# Patient Record
Sex: FEMALE | Race: WHITE | NOT HISPANIC OR LATINO | Employment: FULL TIME | URBAN - NONMETROPOLITAN AREA
[De-identification: names, ages, dates, MRNs, and addresses within clinical notes are randomized per-mention and may not be internally consistent; named-entity substitution may affect disease eponyms.]

---
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
## 2019-01-09 ENCOUNTER — HOSPITAL ENCOUNTER (EMERGENCY)
Facility: HOSPITAL | Age: 19
Discharge: HOME | End: 2019-01-09
Attending: EMERGENCY MEDICINE | Admitting: EMERGENCY MEDICINE

## 2019-01-09 VITALS
BODY MASS INDEX: 31.28 KG/M2 | HEIGHT: 62 IN | OXYGEN SATURATION: 96 % | DIASTOLIC BLOOD PRESSURE: 61 MMHG | RESPIRATION RATE: 18 BRPM | TEMPERATURE: 98.4 F | HEART RATE: 74 BPM | WEIGHT: 170 LBS | SYSTOLIC BLOOD PRESSURE: 92 MMHG

## 2019-01-09 DIAGNOSIS — T50.901A ACCIDENTAL DRUG INGESTION, INITIAL ENCOUNTER: Primary | ICD-10-CM

## 2019-01-09 PROCEDURE — 99283 EMERGENCY DEPT VISIT LOW MDM: CPT

## 2019-01-09 NOTE — ED PROVIDER NOTES
Subjective   History of Present Illness   18-year-old female history depression and anxiety who actually took with her morning and evening doses of latuda, trazodone, hydroxyzine.  She had a single episode of emesis but otherwise is felt normal since this occurred.  Has no current complaints.  Her doses of each are respectively 80mg, 100mg and 25mg.    Review of Systems   All other systems reviewed and are negative.      Past Medical History:   Diagnosis Date   • Anxiety    • Asthma    • Depression    • Disease of thyroid gland        No Known Allergies    History reviewed. No pertinent surgical history.    Family History   Problem Relation Age of Onset   • Diabetes Father    • Diabetes Paternal Grandfather    • Diabetes Paternal Grandmother    • Stroke Maternal Grandmother        Social History     Socioeconomic History   • Marital status: Single     Spouse name: Not on file   • Number of children: Not on file   • Years of education: Not on file   • Highest education level: Not on file   Tobacco Use   • Smoking status: Never Smoker   • Smokeless tobacco: Never Used   Substance and Sexual Activity   • Alcohol use: No   • Drug use: No   • Sexual activity: Yes     Birth control/protection: Implant           Objective   Physical Exam   Constitutional: She is oriented to person, place, and time. She appears well-developed and well-nourished. No distress.   HENT:   Head: Normocephalic.   Mouth/Throat: Oropharynx is clear and moist. No oropharyngeal exudate.   Eyes: Right eye exhibits no discharge. Left eye exhibits no discharge. No scleral icterus.   Neck: Neck supple. No tracheal deviation present.   Cardiovascular: Normal rate, regular rhythm, normal heart sounds and intact distal pulses. Exam reveals no gallop and no friction rub.   No murmur heard.  Pulmonary/Chest: Effort normal and breath sounds normal. No stridor. No respiratory distress. She has no wheezes. She has no rales.   Abdominal: Soft. She exhibits no  distension and no mass. There is no tenderness. There is no rebound and no guarding.   Musculoskeletal: She exhibits no edema or deformity.   Neurological: She is alert and oriented to person, place, and time.   Skin: Skin is warm and dry. She is not diaphoretic. No erythema. No pallor.   Psychiatric: She has a normal mood and affect. Her behavior is normal.   Nursing note and vitals reviewed.      Procedures           ED Course                  MDM  18-year-old female here after axonal taking with morning and evening doses of 3 medications.  Afebrile, vital signs stable.  Very low suspicion for any type of adverse effect from these medications given that she is still likely in the therapeutic window.  Discussed that they may make her slightly more sleepy than usual.  However, will discuss with poison control to make sure they have no other suggestions and anticipate discharge home.    Final diagnoses:   Accidental drug ingestion, initial encounter            Johan Winchester MD  01/09/19 8073

## 2019-03-07 VITALS
SYSTOLIC BLOOD PRESSURE: 115 MMHG | OXYGEN SATURATION: 100 % | HEART RATE: 67 BPM | TEMPERATURE: 97.3 F | BODY MASS INDEX: 29.92 KG/M2 | DIASTOLIC BLOOD PRESSURE: 72 MMHG | HEIGHT: 62 IN | WEIGHT: 162.6 LBS | RESPIRATION RATE: 18 BRPM

## 2019-03-07 PROCEDURE — 99283 EMERGENCY DEPT VISIT LOW MDM: CPT

## 2019-03-08 ENCOUNTER — HOSPITAL ENCOUNTER (EMERGENCY)
Facility: HOSPITAL | Age: 19
Discharge: HOME | End: 2019-03-08
Attending: EMERGENCY MEDICINE | Admitting: EMERGENCY MEDICINE

## 2019-03-08 DIAGNOSIS — S05.01XA ABRASION OF RIGHT CORNEA, INITIAL ENCOUNTER: Primary | ICD-10-CM

## 2019-03-08 RX ORDER — HYDROXYZINE PAMOATE 25 MG/1
25 CAPSULE ORAL 3 TIMES DAILY PRN
COMMUNITY
End: 2021-11-09

## 2019-03-08 RX ORDER — TETRACAINE HYDROCHLORIDE 5 MG/ML
2 SOLUTION OPHTHALMIC ONCE
Status: COMPLETED | OUTPATIENT
Start: 2019-03-08 | End: 2019-03-08

## 2019-03-08 RX ADMIN — TETRACAINE HYDROCHLORIDE 2 DROP: 5 SOLUTION OPHTHALMIC at 01:30

## 2019-03-08 RX ADMIN — FLUORESCEIN SODIUM 1 STRIP: 1 STRIP OPHTHALMIC at 01:30

## 2019-03-08 NOTE — ED PROVIDER NOTES
Subjective   19-year-old female presents to the ED with a chief complaint of right eye pain.  The patient states that she was at work when she began developing pain and burning in her right eye.  She states that it developed to increased watering and sensitivity to light.  She states that it has worsened since initial onset.  She denies any known trauma or injury.  The patient denies fever or chills.  No other complaints at this time.            Review of Systems   Eyes: Positive for photophobia, pain, discharge and redness. Negative for itching and visual disturbance.   All other systems reviewed and are negative.      Past Medical History:   Diagnosis Date   • Anxiety    • Asthma    • Depression    • Disease of thyroid gland        No Known Allergies    History reviewed. No pertinent surgical history.    Family History   Problem Relation Age of Onset   • Diabetes Father    • Diabetes Paternal Grandfather    • Diabetes Paternal Grandmother    • Stroke Maternal Grandmother        Social History     Socioeconomic History   • Marital status: Single     Spouse name: Not on file   • Number of children: Not on file   • Years of education: Not on file   • Highest education level: Not on file   Tobacco Use   • Smoking status: Never Smoker   • Smokeless tobacco: Never Used   Substance and Sexual Activity   • Alcohol use: No   • Drug use: No   • Sexual activity: Yes     Birth control/protection: Implant           Objective   Physical Exam   Constitutional: She is oriented to person, place, and time. She appears well-developed and well-nourished. No distress.   HENT:   Head: Normocephalic and atraumatic.   Nose: Nose normal.   Eyes: Conjunctivae and EOM are normal.   Fluoroscein seen staining of the right eye performed shows a small corneal abrasion at the 9 o'clock position over the pupil.   Cardiovascular: Normal rate and regular rhythm.   Pulmonary/Chest: Effort normal and breath sounds normal. No respiratory distress.    Abdominal: Soft. She exhibits no distension. There is no tenderness. There is no guarding.   Musculoskeletal: She exhibits no edema or deformity.   Neurological: She is alert and oriented to person, place, and time. No cranial nerve deficit.   Skin: She is not diaphoretic.   Nursing note and vitals reviewed.      Procedures           ED Course        Presents with right eye pain and redness.  She does have some corneal injection on initial examination.  Fluorescein staining was performed showed a corneal abrasion.  She will be discharged with appropriate treatment.          MDM      Final diagnoses:   Abrasion of right cornea, initial encounter            Rishi Hollingsworth, DO  03/08/19 0412

## 2019-05-10 ENCOUNTER — TRANSCRIBE ORDERS (OUTPATIENT)
Dept: ULTRASOUND IMAGING | Facility: HOSPITAL | Age: 19
End: 2019-05-10

## 2019-05-10 DIAGNOSIS — R10.12 LEFT UPPER QUADRANT PAIN: Primary | ICD-10-CM

## 2019-05-12 ENCOUNTER — HOSPITAL ENCOUNTER (EMERGENCY)
Facility: HOSPITAL | Age: 19
Discharge: HOME | End: 2019-05-12
Attending: EMERGENCY MEDICINE | Admitting: EMERGENCY MEDICINE

## 2019-05-12 ENCOUNTER — APPOINTMENT (OUTPATIENT)
Dept: CT IMAGING | Facility: HOSPITAL | Age: 19
End: 2019-05-12

## 2019-05-12 VITALS
SYSTOLIC BLOOD PRESSURE: 112 MMHG | RESPIRATION RATE: 18 BRPM | TEMPERATURE: 98.2 F | OXYGEN SATURATION: 100 % | HEIGHT: 62 IN | HEART RATE: 66 BPM | BODY MASS INDEX: 28.82 KG/M2 | DIASTOLIC BLOOD PRESSURE: 57 MMHG | WEIGHT: 156.6 LBS

## 2019-05-12 DIAGNOSIS — R10.9 LEFT FLANK PAIN: ICD-10-CM

## 2019-05-12 DIAGNOSIS — R10.12 ABDOMINAL PAIN, LEFT UPPER QUADRANT: Primary | ICD-10-CM

## 2019-05-12 LAB
ALBUMIN SERPL-MCNC: 4.6 G/DL (ref 3.5–5)
ALBUMIN/GLOB SERPL: 1.4 G/DL (ref 1–2)
ALP SERPL-CCNC: 61 U/L (ref 38–126)
ALT SERPL W P-5'-P-CCNC: 20 U/L (ref 13–69)
ANION GAP SERPL CALCULATED.3IONS-SCNC: 16 MMOL/L (ref 10–20)
AST SERPL-CCNC: 19 U/L (ref 15–46)
B-HCG UR QL: NEGATIVE
BASOPHILS # BLD AUTO: 0.03 10*3/MM3 (ref 0–0.2)
BASOPHILS NFR BLD AUTO: 0.3 % (ref 0–1.5)
BILIRUB SERPL-MCNC: 0.6 MG/DL (ref 0.2–1.3)
BILIRUB UR QL STRIP: NEGATIVE
BUN BLD-MCNC: 12 MG/DL (ref 7–20)
BUN/CREAT SERPL: 17.1 (ref 7.1–23.5)
CALCIUM SPEC-SCNC: 9.2 MG/DL (ref 8.4–10.2)
CHLORIDE SERPL-SCNC: 103 MMOL/L (ref 98–107)
CLARITY UR: CLEAR
CO2 SERPL-SCNC: 24 MMOL/L (ref 26–30)
COLOR UR: YELLOW
CREAT BLD-MCNC: 0.7 MG/DL (ref 0.6–1.3)
DEPRECATED RDW RBC AUTO: 37.3 FL (ref 37–54)
EOSINOPHIL # BLD AUTO: 0.09 10*3/MM3 (ref 0–0.4)
EOSINOPHIL NFR BLD AUTO: 0.9 % (ref 0.3–6.2)
ERYTHROCYTE [DISTWIDTH] IN BLOOD BY AUTOMATED COUNT: 11.3 % (ref 12.3–15.4)
GFR SERPL CREATININE-BSD FRML MDRD: 107 ML/MIN/1.73
GLOBULIN UR ELPH-MCNC: 3.3 GM/DL
GLUCOSE BLD-MCNC: 88 MG/DL (ref 74–98)
GLUCOSE UR STRIP-MCNC: NEGATIVE MG/DL
HCT VFR BLD AUTO: 41.5 % (ref 34–46.6)
HGB BLD-MCNC: 14.6 G/DL (ref 12–15.9)
HGB UR QL STRIP.AUTO: NEGATIVE
IMM GRANULOCYTES # BLD AUTO: 0.01 10*3/MM3 (ref 0–0.05)
IMM GRANULOCYTES NFR BLD AUTO: 0.1 % (ref 0–0.5)
KETONES UR QL STRIP: NEGATIVE
LEUKOCYTE ESTERASE UR QL STRIP.AUTO: NEGATIVE
LIPASE SERPL-CCNC: 109 U/L (ref 23–300)
LYMPHOCYTES # BLD AUTO: 2.81 10*3/MM3 (ref 0.7–3.1)
LYMPHOCYTES NFR BLD AUTO: 29.6 % (ref 19.6–45.3)
MCH RBC QN AUTO: 31.9 PG (ref 26.6–33)
MCHC RBC AUTO-ENTMCNC: 35.2 G/DL (ref 31.5–35.7)
MCV RBC AUTO: 90.8 FL (ref 79–97)
MONOCYTES # BLD AUTO: 0.45 10*3/MM3 (ref 0.1–0.9)
MONOCYTES NFR BLD AUTO: 4.7 % (ref 5–12)
NEUTROPHILS # BLD AUTO: 6.09 10*3/MM3 (ref 1.7–7)
NEUTROPHILS NFR BLD AUTO: 64.4 % (ref 42.7–76)
NITRITE UR QL STRIP: NEGATIVE
NRBC BLD AUTO-RTO: 0 /100 WBC (ref 0–0.2)
PH UR STRIP.AUTO: 6 [PH] (ref 5–8)
PLATELET # BLD AUTO: 210 10*3/MM3 (ref 140–450)
PMV BLD AUTO: 10.8 FL (ref 6–12)
POTASSIUM BLD-SCNC: 4 MMOL/L (ref 3.5–5.1)
PROT SERPL-MCNC: 7.9 G/DL (ref 6.3–8.2)
PROT UR QL STRIP: NEGATIVE
RBC # BLD AUTO: 4.57 10*6/MM3 (ref 3.77–5.28)
SODIUM BLD-SCNC: 139 MMOL/L (ref 137–145)
SP GR UR STRIP: 1.02 (ref 1–1.03)
UROBILINOGEN UR QL STRIP: NORMAL
WBC NRBC COR # BLD: 9.48 10*3/MM3 (ref 3.4–10.8)

## 2019-05-12 PROCEDURE — 25010000002 IOPAMIDOL 61 % SOLUTION: Performed by: EMERGENCY MEDICINE

## 2019-05-12 PROCEDURE — 85025 COMPLETE CBC W/AUTO DIFF WBC: CPT | Performed by: PHYSICIAN ASSISTANT

## 2019-05-12 PROCEDURE — 80053 COMPREHEN METABOLIC PANEL: CPT | Performed by: PHYSICIAN ASSISTANT

## 2019-05-12 PROCEDURE — 83690 ASSAY OF LIPASE: CPT | Performed by: PHYSICIAN ASSISTANT

## 2019-05-12 PROCEDURE — 74177 CT ABD & PELVIS W/CONTRAST: CPT

## 2019-05-12 PROCEDURE — 81003 URINALYSIS AUTO W/O SCOPE: CPT | Performed by: PHYSICIAN ASSISTANT

## 2019-05-12 PROCEDURE — 81025 URINE PREGNANCY TEST: CPT | Performed by: PHYSICIAN ASSISTANT

## 2019-05-12 PROCEDURE — 99283 EMERGENCY DEPT VISIT LOW MDM: CPT

## 2019-05-12 RX ORDER — SODIUM CHLORIDE 0.9 % (FLUSH) 0.9 %
10 SYRINGE (ML) INJECTION AS NEEDED
Status: DISCONTINUED | OUTPATIENT
Start: 2019-05-12 | End: 2019-05-12 | Stop reason: HOSPADM

## 2019-05-12 RX ORDER — POLYETHYLENE GLYCOL 3350 17 G/17G
17 POWDER, FOR SOLUTION ORAL 2 TIMES DAILY
Qty: 12 PACKET | Refills: 0 | Status: SHIPPED | OUTPATIENT
Start: 2019-05-12 | End: 2021-11-09

## 2019-05-12 RX ORDER — SIMETHICONE 80 MG
80 TABLET,CHEWABLE ORAL EVERY 6 HOURS PRN
Qty: 40 TABLET | Refills: 0 | Status: SHIPPED | OUTPATIENT
Start: 2019-05-12 | End: 2021-11-09

## 2019-05-12 RX ADMIN — SODIUM CHLORIDE 1000 ML: 9 INJECTION, SOLUTION INTRAVENOUS at 11:46

## 2019-05-12 RX ADMIN — IOPAMIDOL 100 ML: 612 INJECTION, SOLUTION INTRAVENOUS at 13:21

## 2019-05-12 NOTE — ED PROVIDER NOTES
"Subjective   19-year-old female presents to emergency department with a 2-week history of left upper quadrant abdominal pain, radiating through \"my whole left side\".  She states the pain began intermittently, but is now fairly constant.  Slightly worse with movement, associated with increased fatigue, increased frequency.  She denies cough chest congestion sputum or hemoptysis.  No tachypnea or respiratory distress.  No melena or hematochezia.  No pale chalky or yellow stool.  No documented fevers, but complain of chills and seats a t night.  She does have a history of asthma.  No dysuria hematuria pyuria.  No vaginal discharge or vaginal bleeding.  Last menstrual period was unknown, she has an IUD.  Surgical history is unremarkable.  Social history she denies tobacco alcohol or drug use.  She is employed as a  at [employer].  Family history is remarkable for diabetes and CVA.  Medication list reviewed.  She denies drug allergies.        History provided by:  Patient  Illness   Location:  Per HPI  Quality:  Per HPI  Severity:  Moderate  Onset quality:  Gradual  Duration:  2 weeks  Timing:  Intermittent  Progression:  Waxing and waning  Chronicity:  New  Context:  Per HPI  Relieved by:  Per HPI  Worsened by:  Per HPI  Ineffective treatments:  Per HPI  Associated symptoms: abdominal pain and fatigue    Associated symptoms: no chest pain, no congestion, no cough, no diarrhea, no fever, no headaches, no myalgias, no nausea, no rash, no rhinorrhea, no shortness of breath and no vomiting        Review of Systems   Constitutional: Positive for activity change, appetite change, chills and fatigue. Negative for fever.   HENT: Negative for congestion and rhinorrhea.    Respiratory: Negative for cough and shortness of breath.    Cardiovascular: Negative for chest pain.   Gastrointestinal: Positive for abdominal pain and constipation. Negative for abdominal distention, blood in stool, diarrhea, nausea and vomiting. "   Genitourinary: Negative for difficulty urinating, dysuria, flank pain, vaginal bleeding and vaginal discharge.   Musculoskeletal: Negative for myalgias.   Skin: Negative for rash.   Neurological: Negative for headaches.   All other systems reviewed and are negative.      Past Medical History:   Diagnosis Date   • Anxiety    • Asthma    • Depression    • Disease of thyroid gland        No Known Allergies    History reviewed. No pertinent surgical history.    Family History   Problem Relation Age of Onset   • Diabetes Father    • Diabetes Paternal Grandfather    • Diabetes Paternal Grandmother    • Stroke Maternal Grandmother        Social History     Socioeconomic History   • Marital status: Single     Spouse name: Not on file   • Number of children: Not on file   • Years of education: Not on file   • Highest education level: Not on file   Tobacco Use   • Smoking status: Never Smoker   • Smokeless tobacco: Never Used   Substance and Sexual Activity   • Alcohol use: No   • Drug use: No   • Sexual activity: Yes     Birth control/protection: Implant           Objective   Physical Exam   Constitutional: She is oriented to person, place, and time. She appears well-developed and well-nourished. No distress.   HENT:   Head: Normocephalic and atraumatic.   Right Ear: External ear normal.   Left Ear: External ear normal.   Nose: Nose normal.   Mouth/Throat: Oropharynx is clear and moist. No oropharyngeal exudate.   Eyes: Conjunctivae and EOM are normal. Pupils are equal, round, and reactive to light. Right eye exhibits no discharge. Left eye exhibits no discharge. No scleral icterus.   Neck: Normal range of motion. Neck supple. No JVD present. No tracheal deviation present. No thyromegaly present.   Cardiovascular: Normal rate.   Pulmonary/Chest: Effort normal. No stridor. No respiratory distress.   Abdominal: Soft. Bowel sounds are normal. She exhibits no distension, no abdominal bruit, no pulsatile midline mass and no  mass. There is no hepatosplenomegaly. There is tenderness in the left upper quadrant. There is no rigidity, no rebound, no guarding, no CVA tenderness (mild L CVA), no tenderness at McBurney's point and negative Munson's sign.   Musculoskeletal: Normal range of motion. She exhibits no edema, tenderness or deformity.   Neurological: She is alert and oriented to person, place, and time. No cranial nerve deficit. She exhibits normal muscle tone. Coordination normal.   Skin: Skin is warm and dry. No rash noted. She is not diaphoretic. No erythema. No pallor.   Psychiatric: She has a normal mood and affect. Her behavior is normal. Judgment and thought content normal.   Nursing note and vitals reviewed.      Procedures           ED Course        Recent Results (from the past 24 hour(s))   Comprehensive Metabolic Panel    Collection Time: 05/12/19 11:43 AM   Result Value Ref Range    Glucose 88 74 - 98 mg/dL    BUN 12 7 - 20 mg/dL    Creatinine 0.70 0.60 - 1.30 mg/dL    Sodium 139 137 - 145 mmol/L    Potassium 4.0 3.5 - 5.1 mmol/L    Chloride 103 98 - 107 mmol/L    CO2 24.0 (L) 26.0 - 30.0 mmol/L    Calcium 9.2 8.4 - 10.2 mg/dL    Total Protein 7.9 6.3 - 8.2 g/dL    Albumin 4.60 3.50 - 5.00 g/dL    ALT (SGPT) 20 13 - 69 U/L    AST (SGOT) 19 15 - 46 U/L    Alkaline Phosphatase 61 38 - 126 U/L    Total Bilirubin 0.6 0.2 - 1.3 mg/dL    eGFR Non African Amer 107 >60 mL/min/1.73    Globulin 3.3 gm/dL    A/G Ratio 1.4 1.0 - 2.0 g/dL    BUN/Creatinine Ratio 17.1 7.1 - 23.5    Anion Gap 16.0 10.0 - 20.0 mmol/L   Urinalysis With Microscopic If Indicated (No Culture) - Urine, Clean Catch    Collection Time: 05/12/19 11:43 AM   Result Value Ref Range    Color, UA Yellow Yellow, Straw    Appearance, UA Clear Clear    pH, UA 6.0 5.0 - 8.0    Specific Gravity, UA 1.021 1.005 - 1.030    Glucose, UA Negative Negative    Ketones, UA Negative Negative    Bilirubin, UA Negative Negative    Blood, UA Negative Negative    Protein, UA Negative  Negative    Leuk Esterase, UA Negative Negative    Nitrite, UA Negative Negative    Urobilinogen, UA 0.2 E.U./dL 0.2 - 1.0 E.U./dL   Lipase    Collection Time: 05/12/19 11:43 AM   Result Value Ref Range    Lipase 109 23 - 300 U/L   Pregnancy, Urine - Urine, Clean Catch    Collection Time: 05/12/19 11:43 AM   Result Value Ref Range    HCG, Urine QL Negative Negative   CBC Auto Differential    Collection Time: 05/12/19 11:43 AM   Result Value Ref Range    WBC 9.48 3.40 - 10.80 10*3/mm3    RBC 4.57 3.77 - 5.28 10*6/mm3    Hemoglobin 14.6 12.0 - 15.9 g/dL    Hematocrit 41.5 34.0 - 46.6 %    MCV 90.8 79.0 - 97.0 fL    MCH 31.9 26.6 - 33.0 pg    MCHC 35.2 31.5 - 35.7 g/dL    RDW 11.3 (L) 12.3 - 15.4 %    RDW-SD 37.3 37.0 - 54.0 fl    MPV 10.8 6.0 - 12.0 fL    Platelets 210 140 - 450 10*3/mm3    Neutrophil % 64.4 42.7 - 76.0 %    Lymphocyte % 29.6 19.6 - 45.3 %    Monocyte % 4.7 (L) 5.0 - 12.0 %    Eosinophil % 0.9 0.3 - 6.2 %    Basophil % 0.3 0.0 - 1.5 %    Immature Grans % 0.1 0.0 - 0.5 %    Neutrophils, Absolute 6.09 1.70 - 7.00 10*3/mm3    Lymphocytes, Absolute 2.81 0.70 - 3.10 10*3/mm3    Monocytes, Absolute 0.45 0.10 - 0.90 10*3/mm3    Eosinophils, Absolute 0.09 0.00 - 0.40 10*3/mm3    Basophils, Absolute 0.03 0.00 - 0.20 10*3/mm3    Immature Grans, Absolute 0.01 0.00 - 0.05 10*3/mm3    nRBC 0.0 0.0 - 0.2 /100 WBC     Note: In addition to lab results from this visit, the labs listed above may include labs taken at another facility or during a different encounter within the last 24 hours. Please correlate lab times with ED admission and discharge times for further clarification of the services performed during this visit.    CT Abdomen Pelvis With Contrast   Final Result   No evidence of acute intra-abdominal process.      Authenticated by Bebo North III, MD on 05/12/2019   01:57:18 PM        Vitals:    05/12/19 1103   BP: 101/65   BP Location: Left arm   Patient Position: Sitting   Pulse: 66   Resp: 16  "  Temp: 98.4 °F (36.9 °C)   TempSrc: Oral   SpO2: 99%   Weight: 71 kg (156 lb 9.6 oz)   Height: 157.5 cm (62\")     Medications   sodium chloride 0.9 % flush 10 mL (not administered)   sodium chloride 0.9 % bolus 1,000 mL (0 mL Intravenous Stopped 5/12/19 1253)   iopamidol (ISOVUE-300) 61 % injection 100 mL (100 mL Intravenous Given 5/12/19 1321)     ECG/EMG Results (last 24 hours)     ** No results found for the last 24 hours. **        No orders to display                 MDM      Final diagnoses:   Abdominal pain, left upper quadrant   Left flank pain            Nick Starks PA-C  05/12/19 3803    "

## 2019-05-12 NOTE — DISCHARGE INSTRUCTIONS
Increase clear fluid intake.  Begin taking MiraLAX twice daily until bowel movements are the consistency of peanut butter.  Mylicon/simethicone to assist with gas pain.  Follow-up with your primary care provider for recheck in 2 days.  Return to the emergency department immediately if any change or worsening of your symptoms.

## 2019-05-16 ENCOUNTER — APPOINTMENT (OUTPATIENT)
Dept: ULTRASOUND IMAGING | Facility: HOSPITAL | Age: 19
End: 2019-05-16

## 2019-06-20 ENCOUNTER — OFFICE VISIT (OUTPATIENT)
Dept: OBSTETRICS AND GYNECOLOGY | Facility: CLINIC | Age: 19
End: 2019-06-20

## 2019-06-20 VITALS
DIASTOLIC BLOOD PRESSURE: 70 MMHG | WEIGHT: 152.8 LBS | BODY MASS INDEX: 28.12 KG/M2 | SYSTOLIC BLOOD PRESSURE: 110 MMHG | HEIGHT: 62 IN

## 2019-06-20 DIAGNOSIS — R10.32 LEFT LOWER QUADRANT PAIN: Primary | ICD-10-CM

## 2019-06-20 DIAGNOSIS — Z87.42 HISTORY OF OVARIAN CYST: ICD-10-CM

## 2019-06-20 PROCEDURE — 99214 OFFICE O/P EST MOD 30 MIN: CPT | Performed by: PHYSICIAN ASSISTANT

## 2019-06-20 RX ORDER — NORETHINDRONE ACETATE AND ETHINYL ESTRADIOL 1MG-20(21)
1 KIT ORAL DAILY
Qty: 28 TABLET | Refills: 2 | Status: SHIPPED | OUTPATIENT
Start: 2019-06-20 | End: 2020-06-19

## 2019-06-20 NOTE — PROGRESS NOTES
Subjective   Chief Complaint   Patient presents with   • Follow-up     Following up from [facility] ER.  CT scan done in ER on 19 that showed an ovarian cyst.  CT is in chart       Raquel Guzman is a 19 y.o. year old  presenting to be seen for an ovarian cyst that was found on recent CT scan 2019 during an ER visit.  Patient reports that she had been experiencing some intermittent left mid abdominal pain that was occurring about every 2 weeks lasting 1 to 2 days.  Pain initially started around early May.  During her ER visit for 2019 she was noted to have a normal CBC, normal CMP, normal lipase, negative urine pregnancy test.  A CT scan done noted a small left ovarian cyst, no measurement given, and normal abdominal and pelvic anatomy otherwise.  The patient reports that for about 1 week she has had a loose stool but otherwise no bowel changes.  She has had no urinary symptoms.  She denies vaginal discharge.  She has Kyleena IUD placed about 1 year ago.  She has no bleeds with the IUD.  No fever or chills.    Past Medical History:   Diagnosis Date   • Anxiety    • Asthma    • Depression    • Disease of thyroid gland         Current Outpatient Medications:   •  buPROPion XL (WELLBUTRIN XL) 300 MG 24 hr tablet, Take 300 mg by mouth Daily., Disp: , Rfl:   •  hydrOXYzine pamoate (VISTARIL) 25 MG capsule, Take 25 mg by mouth 3 (Three) Times a Day As Needed for Itching., Disp: , Rfl:   •  lamoTRIgine (LaMICtal) 100 MG tablet, Take 100 mg by mouth Daily., Disp: , Rfl:   •  levothyroxine (SYNTHROID, LEVOTHROID) 25 MCG tablet, Take 25 mcg by mouth Daily., Disp: , Rfl:   •  lurasidone (LATUDA) 40 MG tablet tablet, Take 40 mg by mouth Daily., Disp: , Rfl:   •  norethindrone-ethinyl estradiol FE (JUNEL FE 1/20) 1-20 MG-MCG per tablet, Take 1 tablet by mouth Daily., Disp: 28 tablet, Rfl: 2  •  polyethylene glycol (MIRALAX) packet, Take 17 g by mouth 2 (Two) Times a Day. Until BM's are consistency  "of peanut butter, then take once daily as needed, Disp: 12 packet, Rfl: 0  •  simethicone (GAS-X) 80 MG chewable tablet, Chew 1 tablet Every 6 (Six) Hours As Needed for Flatulence., Disp: 40 tablet, Rfl: 0  •  tobramycin in sterile water (preservative free) injection-balanced salts ophthalmic solution, Apply 1-2 drops to eye(s) as directed by provider Every 4 (Four) Hours., Disp: 5 mL, Rfl: 0  •  traZODone (DESYREL) 50 MG tablet, Take 50 mg by mouth Every Night., Disp: , Rfl:   •  vitamin B-12 (CYANOCOBALAMIN) 100 MCG tablet, Take 50 mcg by mouth Daily., Disp: , Rfl:    No Known Allergies   History reviewed. No pertinent surgical history.   Social History     Socioeconomic History   • Marital status: Single     Spouse name: Not on file   • Number of children: Not on file   • Years of education: Not on file   • Highest education level: Not on file   Tobacco Use   • Smoking status: Never Smoker   • Smokeless tobacco: Never Used   Substance and Sexual Activity   • Alcohol use: No   • Drug use: No   • Sexual activity: Yes     Partners: Male     Birth control/protection: Implant      Family History   Problem Relation Age of Onset   • Diabetes Father    • Diabetes Paternal Grandfather    • Diabetes Paternal Grandmother    • Stroke Maternal Grandmother        Review of Systems   Constitutional: Negative.    Gastrointestinal: Positive for abdominal pain and diarrhea. Negative for nausea and vomiting.   Genitourinary: Positive for pelvic pain. Negative for difficulty urinating, dysuria, menstrual problem, vaginal bleeding and vaginal discharge.           Objective   /70   Ht 157.5 cm (62\")   Wt 69.3 kg (152 lb 12.8 oz)   Breastfeeding? No   BMI 27.95 kg/m²     Physical Exam   Constitutional: She appears well-developed and well-nourished. She is cooperative. No distress.   Abdominal: Soft. Normal appearance. There is no tenderness. There is no rigidity and no guarding.   Genitourinary: Vagina normal and uterus " normal. There is no tenderness or lesion on the right labia. There is no tenderness or lesion on the left labia. Cervix exhibits no motion tenderness and no discharge. Right adnexum displays no mass and no tenderness. Left adnexum displays no mass and no tenderness.   Neurological: She is alert.   Skin: Skin is warm and dry.   Psychiatric: She has a normal mood and affect. Her behavior is normal.            Assessment and Plan  Raquel was seen today for follow-up.    Diagnoses and all orders for this visit:    Left lower quadrant pain    History of ovarian cyst    Other orders  -     norethindrone-ethinyl estradiol FE (JUNEL FE 1/20) 1-20 MG-MCG per tablet; Take 1 tablet by mouth Daily.      Patient Instructions   Patient with benign exam  CT scan from May noted small left ovarian cyst that is most likely physiologic.  With intermittant pain suspect ovulation pain or pain related to small ovarian cysts and discussed option of trial of low dose oc for 2-3 months to see if pain suppressed. Patient would like to try this  May start pills today or tomorrow. Encouraged to take pills consistently  Follow up 2 months or prn             This note was electronically signed.    Ivett Raymundo PA-C   June 21, 2019

## 2019-06-21 NOTE — PATIENT INSTRUCTIONS
Patient with benign exam  CT scan from May noted small left ovarian cyst that is most likely physiologic.  With intermittant pain suspect ovulation pain or pain related to small ovarian cysts and discussed option of trial of low dose oc for 2-3 months to see if pain suppressed. Patient would like to try this  May start pills today or tomorrow. Encouraged to take pills consistently  Follow up 2 months or prn

## 2019-09-29 ENCOUNTER — APPOINTMENT (OUTPATIENT)
Dept: CT IMAGING | Facility: HOSPITAL | Age: 19
End: 2019-09-29

## 2019-09-29 ENCOUNTER — HOSPITAL ENCOUNTER (EMERGENCY)
Facility: HOSPITAL | Age: 19
Discharge: HOME | End: 2019-09-29
Attending: STUDENT IN AN ORGANIZED HEALTH CARE EDUCATION/TRAINING PROGRAM | Admitting: STUDENT IN AN ORGANIZED HEALTH CARE EDUCATION/TRAINING PROGRAM

## 2019-09-29 VITALS
HEIGHT: 62 IN | DIASTOLIC BLOOD PRESSURE: 69 MMHG | BODY MASS INDEX: 27.38 KG/M2 | WEIGHT: 148.8 LBS | HEART RATE: 77 BPM | SYSTOLIC BLOOD PRESSURE: 102 MMHG | OXYGEN SATURATION: 97 % | RESPIRATION RATE: 16 BRPM | TEMPERATURE: 98.5 F

## 2019-09-29 DIAGNOSIS — R19.7 DIARRHEA, UNSPECIFIED TYPE: Primary | ICD-10-CM

## 2019-09-29 LAB
ALBUMIN SERPL-MCNC: 4.6 G/DL (ref 3.5–5.2)
ALBUMIN/GLOB SERPL: 1.6 G/DL
ALP SERPL-CCNC: 89 U/L (ref 39–117)
ALT SERPL W P-5'-P-CCNC: 10 U/L (ref 1–33)
ANION GAP SERPL CALCULATED.3IONS-SCNC: 13.5 MMOL/L (ref 5–15)
AST SERPL-CCNC: 21 U/L (ref 1–32)
B-HCG UR QL: NEGATIVE
BASOPHILS # BLD MANUAL: 0.22 10*3/MM3 (ref 0–0.2)
BASOPHILS NFR BLD AUTO: 1 % (ref 0–1.5)
BILIRUB SERPL-MCNC: 0.5 MG/DL (ref 0.2–1.2)
BILIRUB UR QL STRIP: NEGATIVE
BUN BLD-MCNC: 8 MG/DL (ref 6–20)
BUN/CREAT SERPL: 10.8 (ref 7–25)
CALCIUM SPEC-SCNC: 9.4 MG/DL (ref 8.6–10.5)
CHLORIDE SERPL-SCNC: 104 MMOL/L (ref 98–107)
CLARITY UR: ABNORMAL
CO2 SERPL-SCNC: 22.5 MMOL/L (ref 22–29)
COLOR UR: YELLOW
CREAT BLD-MCNC: 0.74 MG/DL (ref 0.57–1)
DEPRECATED RDW RBC AUTO: 37.7 FL (ref 37–54)
EOSINOPHIL # BLD MANUAL: 7.65 10*3/MM3 (ref 0–0.4)
EOSINOPHIL NFR BLD MANUAL: 35 % (ref 0.3–6.2)
ERYTHROCYTE [DISTWIDTH] IN BLOOD BY AUTOMATED COUNT: 11.4 % (ref 12.3–15.4)
GFR SERPL CREATININE-BSD FRML MDRD: 100 ML/MIN/1.73
GLOBULIN UR ELPH-MCNC: 2.8 GM/DL
GLUCOSE BLD-MCNC: 93 MG/DL (ref 65–99)
GLUCOSE UR STRIP-MCNC: NEGATIVE MG/DL
HCT VFR BLD AUTO: 42.9 % (ref 34–46.6)
HGB BLD-MCNC: 14.4 G/DL (ref 12–15.9)
HGB UR QL STRIP.AUTO: NEGATIVE
HOLD SPECIMEN: NORMAL
HOLD SPECIMEN: NORMAL
KETONES UR QL STRIP: NEGATIVE
LEUKOCYTE ESTERASE UR QL STRIP.AUTO: NEGATIVE
LIPASE SERPL-CCNC: 20 U/L (ref 13–60)
LYMPHOCYTES # BLD MANUAL: 4.59 10*3/MM3 (ref 0.7–3.1)
LYMPHOCYTES NFR BLD MANUAL: 21 % (ref 19.6–45.3)
LYMPHOCYTES NFR BLD MANUAL: 4 % (ref 5–12)
MCH RBC QN AUTO: 30.4 PG (ref 26.6–33)
MCHC RBC AUTO-ENTMCNC: 33.6 G/DL (ref 31.5–35.7)
MCV RBC AUTO: 90.5 FL (ref 79–97)
MONOCYTES # BLD AUTO: 0.87 10*3/MM3 (ref 0.1–0.9)
NEUTROPHILS # BLD AUTO: 8.31 10*3/MM3 (ref 1.7–7)
NEUTROPHILS NFR BLD MANUAL: 36 % (ref 42.7–76)
NEUTS BAND NFR BLD MANUAL: 2 % (ref 0–5)
NITRITE UR QL STRIP: NEGATIVE
PH UR STRIP.AUTO: <=5 [PH] (ref 5–8)
PLATELET # BLD AUTO: 235 10*3/MM3 (ref 140–450)
PMV BLD AUTO: 10.4 FL (ref 6–12)
POTASSIUM BLD-SCNC: 4.5 MMOL/L (ref 3.5–5.2)
PROT SERPL-MCNC: 7.4 G/DL (ref 6–8.5)
PROT UR QL STRIP: NEGATIVE
RBC # BLD AUTO: 4.74 10*6/MM3 (ref 3.77–5.28)
RBC MORPH BLD: NORMAL
SCAN SLIDE: NORMAL
SMALL PLATELETS BLD QL SMEAR: ADEQUATE
SODIUM BLD-SCNC: 140 MMOL/L (ref 136–145)
SP GR UR STRIP: 1.02 (ref 1–1.03)
UROBILINOGEN UR QL STRIP: ABNORMAL
VARIANT LYMPHS NFR BLD MANUAL: 1 % (ref 0–5)
WBC MORPH BLD: NORMAL
WBC NRBC COR # BLD: 21.86 10*3/MM3 (ref 3.4–10.8)
WHOLE BLOOD HOLD SPECIMEN: NORMAL
WHOLE BLOOD HOLD SPECIMEN: NORMAL

## 2019-09-29 PROCEDURE — 85007 BL SMEAR W/DIFF WBC COUNT: CPT | Performed by: STUDENT IN AN ORGANIZED HEALTH CARE EDUCATION/TRAINING PROGRAM

## 2019-09-29 PROCEDURE — 85060 BLOOD SMEAR INTERPRETATION: CPT | Performed by: STUDENT IN AN ORGANIZED HEALTH CARE EDUCATION/TRAINING PROGRAM

## 2019-09-29 PROCEDURE — 99283 EMERGENCY DEPT VISIT LOW MDM: CPT

## 2019-09-29 PROCEDURE — 83690 ASSAY OF LIPASE: CPT | Performed by: STUDENT IN AN ORGANIZED HEALTH CARE EDUCATION/TRAINING PROGRAM

## 2019-09-29 PROCEDURE — 81003 URINALYSIS AUTO W/O SCOPE: CPT | Performed by: STUDENT IN AN ORGANIZED HEALTH CARE EDUCATION/TRAINING PROGRAM

## 2019-09-29 PROCEDURE — 25010000002 IOPAMIDOL 61 % SOLUTION: Performed by: STUDENT IN AN ORGANIZED HEALTH CARE EDUCATION/TRAINING PROGRAM

## 2019-09-29 PROCEDURE — 85025 COMPLETE CBC W/AUTO DIFF WBC: CPT | Performed by: STUDENT IN AN ORGANIZED HEALTH CARE EDUCATION/TRAINING PROGRAM

## 2019-09-29 PROCEDURE — 74177 CT ABD & PELVIS W/CONTRAST: CPT

## 2019-09-29 PROCEDURE — 80053 COMPREHEN METABOLIC PANEL: CPT | Performed by: STUDENT IN AN ORGANIZED HEALTH CARE EDUCATION/TRAINING PROGRAM

## 2019-09-29 PROCEDURE — 81025 URINE PREGNANCY TEST: CPT | Performed by: STUDENT IN AN ORGANIZED HEALTH CARE EDUCATION/TRAINING PROGRAM

## 2019-09-29 RX ORDER — DICYCLOMINE HCL 20 MG
20 TABLET ORAL EVERY 6 HOURS PRN
Qty: 20 TABLET | Refills: 0 | Status: SHIPPED | OUTPATIENT
Start: 2019-09-29 | End: 2021-11-09

## 2019-09-29 RX ORDER — SODIUM CHLORIDE 0.9 % (FLUSH) 0.9 %
10 SYRINGE (ML) INJECTION AS NEEDED
Status: DISCONTINUED | OUTPATIENT
Start: 2019-09-29 | End: 2019-09-29 | Stop reason: HOSPADM

## 2019-09-29 RX ORDER — ONDANSETRON 4 MG/1
4 TABLET, ORALLY DISINTEGRATING ORAL EVERY 6 HOURS PRN
Qty: 20 TABLET | Refills: 0 | Status: SHIPPED | OUTPATIENT
Start: 2019-09-29 | End: 2021-11-09

## 2019-09-29 RX ADMIN — IOPAMIDOL 100 ML: 612 INJECTION, SOLUTION INTRAVENOUS at 11:59

## 2019-09-29 RX ADMIN — SODIUM CHLORIDE 1000 ML: 9 INJECTION, SOLUTION INTRAVENOUS at 11:02

## 2019-09-30 LAB
CYTOLOGIST CVX/VAG CYTO: NORMAL
PATH INTERP BLD-IMP: NORMAL

## 2019-09-30 PROCEDURE — 87329 GIARDIA AG IA: CPT | Performed by: EMERGENCY MEDICINE

## 2019-09-30 PROCEDURE — 87328 CRYPTOSPORIDIUM AG IA: CPT | Performed by: EMERGENCY MEDICINE

## 2019-09-30 PROCEDURE — 87425 ROTAVIRUS AG IA: CPT | Performed by: EMERGENCY MEDICINE

## 2019-10-01 LAB
CRYPTOSP AG STL QL IA: NEGATIVE
G LAMBLIA AG STL QL IA: NEGATIVE

## 2019-10-03 LAB — RV AG STL QL IA: NEGATIVE

## 2019-11-20 ENCOUNTER — TRANSCRIBE ORDERS (OUTPATIENT)
Dept: ULTRASOUND IMAGING | Facility: HOSPITAL | Age: 19
End: 2019-11-20

## 2019-11-20 DIAGNOSIS — R10.9 ABDOMINAL PAIN, UNSPECIFIED ABDOMINAL LOCATION: Primary | ICD-10-CM

## 2019-11-22 ENCOUNTER — HOSPITAL ENCOUNTER (OUTPATIENT)
Dept: ULTRASOUND IMAGING | Facility: HOSPITAL | Age: 19
Discharge: HOME | End: 2019-11-22
Admitting: NURSE PRACTITIONER

## 2019-11-22 DIAGNOSIS — R10.9 ABDOMINAL PAIN, UNSPECIFIED ABDOMINAL LOCATION: ICD-10-CM

## 2019-11-22 PROCEDURE — 76700 US EXAM ABDOM COMPLETE: CPT

## 2019-11-26 ENCOUNTER — TRANSCRIBE ORDERS (OUTPATIENT)
Dept: ADMINISTRATIVE | Facility: HOSPITAL | Age: 19
End: 2019-11-26

## 2019-11-26 DIAGNOSIS — R10.9 ABDOMINAL PAIN, UNSPECIFIED ABDOMINAL LOCATION: Primary | ICD-10-CM

## 2019-11-29 ENCOUNTER — APPOINTMENT (OUTPATIENT)
Dept: ULTRASOUND IMAGING | Facility: HOSPITAL | Age: 19
End: 2019-11-29

## 2019-11-30 ENCOUNTER — HOSPITAL ENCOUNTER (EMERGENCY)
Facility: HOSPITAL | Age: 19
Discharge: HOME | End: 2019-11-30
Attending: EMERGENCY MEDICINE | Admitting: EMERGENCY MEDICINE

## 2019-11-30 VITALS
SYSTOLIC BLOOD PRESSURE: 100 MMHG | DIASTOLIC BLOOD PRESSURE: 61 MMHG | OXYGEN SATURATION: 99 % | RESPIRATION RATE: 18 BRPM | HEART RATE: 61 BPM | BODY MASS INDEX: 28.34 KG/M2 | HEIGHT: 62 IN | TEMPERATURE: 97.7 F | WEIGHT: 154 LBS

## 2019-11-30 DIAGNOSIS — R10.9 ABDOMINAL PAIN, UNSPECIFIED ABDOMINAL LOCATION: Primary | ICD-10-CM

## 2019-11-30 LAB
ALBUMIN SERPL-MCNC: 4 G/DL (ref 3.5–5.2)
ALBUMIN/GLOB SERPL: 1.5 G/DL
ALP SERPL-CCNC: 71 U/L (ref 39–117)
ALT SERPL W P-5'-P-CCNC: 9 U/L (ref 1–33)
ANION GAP SERPL CALCULATED.3IONS-SCNC: 9.8 MMOL/L (ref 5–15)
AST SERPL-CCNC: 12 U/L (ref 1–32)
B-HCG UR QL: NEGATIVE
BASOPHILS # BLD AUTO: 0.04 10*3/MM3 (ref 0–0.2)
BASOPHILS NFR BLD AUTO: 0.4 % (ref 0–1.5)
BILIRUB SERPL-MCNC: 0.2 MG/DL (ref 0.2–1.2)
BILIRUB UR QL STRIP: NEGATIVE
BUN BLD-MCNC: 13 MG/DL (ref 6–20)
BUN/CREAT SERPL: 17.8 (ref 7–25)
CALCIUM SPEC-SCNC: 9.1 MG/DL (ref 8.6–10.5)
CHLORIDE SERPL-SCNC: 106 MMOL/L (ref 98–107)
CLARITY UR: CLEAR
CO2 SERPL-SCNC: 23.2 MMOL/L (ref 22–29)
COLOR UR: YELLOW
CREAT BLD-MCNC: 0.73 MG/DL (ref 0.57–1)
DEPRECATED RDW RBC AUTO: 37.4 FL (ref 37–54)
EOSINOPHIL # BLD AUTO: 0.32 10*3/MM3 (ref 0–0.4)
EOSINOPHIL NFR BLD AUTO: 3 % (ref 0.3–6.2)
ERYTHROCYTE [DISTWIDTH] IN BLOOD BY AUTOMATED COUNT: 11.2 % (ref 12.3–15.4)
GFR SERPL CREATININE-BSD FRML MDRD: 102 ML/MIN/1.73
GLOBULIN UR ELPH-MCNC: 2.7 GM/DL
GLUCOSE BLD-MCNC: 91 MG/DL (ref 65–99)
GLUCOSE UR STRIP-MCNC: NEGATIVE MG/DL
HCT VFR BLD AUTO: 37.2 % (ref 34–46.6)
HGB BLD-MCNC: 12.8 G/DL (ref 12–15.9)
HGB UR QL STRIP.AUTO: NEGATIVE
IMM GRANULOCYTES # BLD AUTO: 0.02 10*3/MM3 (ref 0–0.05)
IMM GRANULOCYTES NFR BLD AUTO: 0.2 % (ref 0–0.5)
KETONES UR QL STRIP: ABNORMAL
LEUKOCYTE ESTERASE UR QL STRIP.AUTO: NEGATIVE
LIPASE SERPL-CCNC: 33 U/L (ref 13–60)
LYMPHOCYTES # BLD AUTO: 4 10*3/MM3 (ref 0.7–3.1)
LYMPHOCYTES NFR BLD AUTO: 38 % (ref 19.6–45.3)
MCH RBC QN AUTO: 31.2 PG (ref 26.6–33)
MCHC RBC AUTO-ENTMCNC: 34.4 G/DL (ref 31.5–35.7)
MCV RBC AUTO: 90.7 FL (ref 79–97)
MONOCYTES # BLD AUTO: 0.67 10*3/MM3 (ref 0.1–0.9)
MONOCYTES NFR BLD AUTO: 6.4 % (ref 5–12)
NEUTROPHILS # BLD AUTO: 5.47 10*3/MM3 (ref 1.7–7)
NEUTROPHILS NFR BLD AUTO: 52 % (ref 42.7–76)
NITRITE UR QL STRIP: NEGATIVE
NRBC BLD AUTO-RTO: 0 /100 WBC (ref 0–0.2)
PH UR STRIP.AUTO: 6 [PH] (ref 5–8)
PLATELET # BLD AUTO: 193 10*3/MM3 (ref 140–450)
PMV BLD AUTO: 10.7 FL (ref 6–12)
POTASSIUM BLD-SCNC: 3.8 MMOL/L (ref 3.5–5.2)
PROT SERPL-MCNC: 6.7 G/DL (ref 6–8.5)
PROT UR QL STRIP: NEGATIVE
RBC # BLD AUTO: 4.1 10*6/MM3 (ref 3.77–5.28)
SODIUM BLD-SCNC: 139 MMOL/L (ref 136–145)
SP GR UR STRIP: >=1.03 (ref 1–1.03)
UROBILINOGEN UR QL STRIP: ABNORMAL
WBC NRBC COR # BLD: 10.52 10*3/MM3 (ref 3.4–10.8)

## 2019-11-30 PROCEDURE — 96374 THER/PROPH/DIAG INJ IV PUSH: CPT

## 2019-11-30 PROCEDURE — 83690 ASSAY OF LIPASE: CPT | Performed by: EMERGENCY MEDICINE

## 2019-11-30 PROCEDURE — 81003 URINALYSIS AUTO W/O SCOPE: CPT | Performed by: EMERGENCY MEDICINE

## 2019-11-30 PROCEDURE — 80053 COMPREHEN METABOLIC PANEL: CPT | Performed by: EMERGENCY MEDICINE

## 2019-11-30 PROCEDURE — 96375 TX/PRO/DX INJ NEW DRUG ADDON: CPT

## 2019-11-30 PROCEDURE — 87086 URINE CULTURE/COLONY COUNT: CPT | Performed by: EMERGENCY MEDICINE

## 2019-11-30 PROCEDURE — 99283 EMERGENCY DEPT VISIT LOW MDM: CPT

## 2019-11-30 PROCEDURE — 25010000002 ONDANSETRON PER 1 MG: Performed by: EMERGENCY MEDICINE

## 2019-11-30 PROCEDURE — 85025 COMPLETE CBC W/AUTO DIFF WBC: CPT | Performed by: EMERGENCY MEDICINE

## 2019-11-30 PROCEDURE — 81025 URINE PREGNANCY TEST: CPT | Performed by: EMERGENCY MEDICINE

## 2019-11-30 RX ORDER — SUCRALFATE 1 G/1
1 TABLET ORAL 4 TIMES DAILY
Qty: 40 TABLET | Refills: 0 | Status: SHIPPED | OUTPATIENT
Start: 2019-11-30 | End: 2021-11-09

## 2019-11-30 RX ORDER — ONDANSETRON 4 MG/1
4 TABLET, ORALLY DISINTEGRATING ORAL EVERY 8 HOURS PRN
Qty: 10 TABLET | Refills: 0 | Status: SHIPPED | OUTPATIENT
Start: 2019-11-30 | End: 2021-11-09

## 2019-11-30 RX ORDER — ONDANSETRON 2 MG/ML
4 INJECTION INTRAMUSCULAR; INTRAVENOUS ONCE
Status: COMPLETED | OUTPATIENT
Start: 2019-11-30 | End: 2019-11-30

## 2019-11-30 RX ORDER — ALUMINA, MAGNESIA, AND SIMETHICONE 2400; 2400; 240 MG/30ML; MG/30ML; MG/30ML
15 SUSPENSION ORAL ONCE
Status: COMPLETED | OUTPATIENT
Start: 2019-11-30 | End: 2019-11-30

## 2019-11-30 RX ORDER — PANTOPRAZOLE SODIUM 20 MG/1
20 TABLET, DELAYED RELEASE ORAL DAILY
Qty: 30 TABLET | Refills: 0 | Status: SHIPPED | OUTPATIENT
Start: 2019-11-30 | End: 2021-11-09

## 2019-11-30 RX ORDER — LIDOCAINE HYDROCHLORIDE 20 MG/ML
15 SOLUTION OROPHARYNGEAL ONCE
Status: COMPLETED | OUTPATIENT
Start: 2019-11-30 | End: 2019-11-30

## 2019-11-30 RX ORDER — FAMOTIDINE 10 MG/ML
20 INJECTION, SOLUTION INTRAVENOUS ONCE
Status: COMPLETED | OUTPATIENT
Start: 2019-11-30 | End: 2019-11-30

## 2019-11-30 RX ADMIN — ALUMINUM HYDROXIDE, MAGNESIUM HYDROXIDE, AND DIMETHICONE 15 ML: 400; 400; 40 SUSPENSION ORAL at 02:17

## 2019-11-30 RX ADMIN — LIDOCAINE HYDROCHLORIDE 15 ML: 20 SOLUTION ORAL; TOPICAL at 02:17

## 2019-11-30 RX ADMIN — SODIUM CHLORIDE 1000 ML: 9 INJECTION, SOLUTION INTRAVENOUS at 02:17

## 2019-11-30 RX ADMIN — FAMOTIDINE 20 MG: 10 INJECTION, SOLUTION INTRAVENOUS at 02:17

## 2019-11-30 RX ADMIN — ONDANSETRON 4 MG: 2 INJECTION INTRAMUSCULAR; INTRAVENOUS at 02:17

## 2019-12-01 LAB — BACTERIA SPEC AEROBE CULT: NORMAL

## 2019-12-05 ENCOUNTER — HOSPITAL ENCOUNTER (OUTPATIENT)
Dept: NUCLEAR MEDICINE | Facility: HOSPITAL | Age: 19
Discharge: HOME | End: 2019-12-05

## 2019-12-05 DIAGNOSIS — R10.9 ABDOMINAL PAIN, UNSPECIFIED ABDOMINAL LOCATION: ICD-10-CM

## 2019-12-05 PROCEDURE — 25010000002 SINCALIDE PER 5 MCG: Performed by: NURSE PRACTITIONER

## 2019-12-05 PROCEDURE — 78227 HEPATOBIL SYST IMAGE W/DRUG: CPT

## 2019-12-05 PROCEDURE — 0 TECHNETIUM TC 99M MEBROFENIN KIT: Performed by: NURSE PRACTITIONER

## 2019-12-05 PROCEDURE — A9537 TC99M MEBROFENIN: HCPCS | Performed by: NURSE PRACTITIONER

## 2019-12-05 RX ORDER — KIT FOR THE PREPARATION OF TECHNETIUM TC 99M MEBROFENIN 45 MG/10ML
1 INJECTION, POWDER, LYOPHILIZED, FOR SOLUTION INTRAVENOUS
Status: COMPLETED | OUTPATIENT
Start: 2019-12-05 | End: 2019-12-05

## 2019-12-05 RX ADMIN — MEBROFENIN 1 DOSE: 45 INJECTION, POWDER, LYOPHILIZED, FOR SOLUTION INTRAVENOUS at 06:19

## 2019-12-05 RX ADMIN — SINCALIDE 1.4 MCG: 5 INJECTION, POWDER, LYOPHILIZED, FOR SOLUTION INTRAVENOUS at 07:06

## 2019-12-06 ENCOUNTER — OFFICE VISIT (OUTPATIENT)
Dept: SURGERY | Facility: CLINIC | Age: 19
End: 2019-12-06

## 2019-12-06 VITALS
SYSTOLIC BLOOD PRESSURE: 112 MMHG | HEIGHT: 62 IN | TEMPERATURE: 98.3 F | HEART RATE: 79 BPM | WEIGHT: 154 LBS | BODY MASS INDEX: 28.34 KG/M2 | OXYGEN SATURATION: 98 % | DIASTOLIC BLOOD PRESSURE: 64 MMHG

## 2019-12-06 DIAGNOSIS — K82.8 BILIARY DYSKINESIA: Primary | ICD-10-CM

## 2019-12-06 PROCEDURE — 99204 OFFICE O/P NEW MOD 45 MIN: CPT | Performed by: SURGERY

## 2019-12-06 RX ORDER — SODIUM CHLORIDE 9 MG/ML
100 INJECTION, SOLUTION INTRAVENOUS CONTINUOUS
Status: CANCELLED | OUTPATIENT
Start: 2019-12-13

## 2019-12-06 RX ORDER — OMEPRAZOLE 20 MG/1
20 CAPSULE, DELAYED RELEASE ORAL DAILY
Refills: 0 | COMMUNITY
Start: 2019-11-30 | End: 2021-11-09

## 2019-12-06 NOTE — H&P (VIEW-ONLY)
Patient: Raquel Guzman    YOB: 2000    Date: 12/06/2019    Primary Care Provider: Kat Yi APRN    Chief Complaint   Patient presents with   • Abdominal Pain       Subjective .     History of present illness:  I saw the patient in the office today as a consultation for evaluation and treatment of abdominal pain. She was recently seen in the ED. She had an ultrasound that was unremarkable. She had a HIDA scan that did show an EF of 58%. She complains of sharp RUQ pain, radiates into her left side. She complains of associated nausea, vomiting, diarrhea and constipation. She states she has had symptoms for a few months and they worsen after meals.  Patient had reproduction of symptoms with Kinevac.  Reproduce her nausea and pain.  Symptoms of being happening for 4 months, last 1 to 2 hours after eating.  Frequently wakes her up at night.  Much worse with fatty foods.  No significant heartburn or reflux symptoms.  No improvement with antacids.    The following portions of the patient's history were reviewed and updated as appropriate: allergies, current medications, past family history, past medical history, past social history, past surgical history and problem list.    Review of Systems   Constitutional: Negative for chills, fever and unexpected weight change.   HENT: Negative for hearing loss, trouble swallowing and voice change.    Eyes: Negative for visual disturbance.   Respiratory: Negative for apnea, cough, chest tightness, shortness of breath and wheezing.    Cardiovascular: Negative for chest pain, palpitations and leg swelling.   Gastrointestinal: Positive for abdominal pain, constipation, diarrhea, nausea and vomiting. Negative for abdominal distention, anal bleeding, blood in stool and rectal pain.   Endocrine: Negative for cold intolerance and heat intolerance.   Genitourinary: Negative for difficulty urinating, dysuria and flank pain.   Musculoskeletal: Negative for back pain  and gait problem.   Skin: Negative for color change, rash and wound.   Neurological: Negative for dizziness, syncope, speech difficulty, weakness, light-headedness, numbness and headaches.   Hematological: Negative for adenopathy. Does not bruise/bleed easily.   Psychiatric/Behavioral: Negative for confusion. The patient is not nervous/anxious.        History:  Past Medical History:   Diagnosis Date   • Anxiety    • Asthma    • Depression    • Disease of thyroid gland        No past surgical history on file.    Family History   Problem Relation Age of Onset   • Diabetes Father    • Diabetes Paternal Grandfather    • Diabetes Paternal Grandmother    • Stroke Maternal Grandmother        Social History     Tobacco Use   • Smoking status: Never Smoker   • Smokeless tobacco: Never Used   Substance Use Topics   • Alcohol use: No   • Drug use: No       Medications:    Current Outpatient Medications:   •  buPROPion XL (WELLBUTRIN XL) 300 MG 24 hr tablet, Take 300 mg by mouth Daily., Disp: , Rfl:   •  dicyclomine (BENTYL) 20 MG tablet, Take 1 tablet by mouth Every 6 (Six) Hours As Needed (abdominal cramps)., Disp: 20 tablet, Rfl: 0  •  hydrOXYzine pamoate (VISTARIL) 25 MG capsule, Take 25 mg by mouth 3 (Three) Times a Day As Needed for Itching., Disp: , Rfl:   •  lamoTRIgine (LaMICtal) 100 MG tablet, Take 100 mg by mouth Daily., Disp: , Rfl:   •  levothyroxine (SYNTHROID, LEVOTHROID) 25 MCG tablet, Take 25 mcg by mouth Daily., Disp: , Rfl:   •  lurasidone (LATUDA) 40 MG tablet tablet, Take 40 mg by mouth Daily., Disp: , Rfl:   •  norethindrone-ethinyl estradiol FE (JUNEL FE 1/20) 1-20 MG-MCG per tablet, Take 1 tablet by mouth Daily., Disp: 28 tablet, Rfl: 2  •  omeprazole (priLOSEC) 20 MG capsule, Take 20 mg by mouth Daily., Disp: , Rfl: 0  •  ondansetron ODT (ZOFRAN-ODT) 4 MG disintegrating tablet, Take 1 tablet by mouth Every 6 (Six) Hours As Needed for Nausea or Vomiting., Disp: 20 tablet, Rfl: 0  •  ondansetron ODT  "(ZOFRAN-ODT) 4 MG disintegrating tablet, Take 1 tablet by mouth Every 8 (Eight) Hours As Needed for Nausea or Vomiting., Disp: 10 tablet, Rfl: 0  •  pantoprazole (PROTONIX) 20 MG EC tablet, Take 1 tablet by mouth Daily., Disp: 30 tablet, Rfl: 0  •  polyethylene glycol (MIRALAX) packet, Take 17 g by mouth 2 (Two) Times a Day. Until BM's are consistency of peanut butter, then take once daily as needed, Disp: 12 packet, Rfl: 0  •  simethicone (GAS-X) 80 MG chewable tablet, Chew 1 tablet Every 6 (Six) Hours As Needed for Flatulence., Disp: 40 tablet, Rfl: 0  •  sucralfate (CARAFATE) 1 g tablet, Take 1 tablet by mouth 4 (Four) Times a Day., Disp: 40 tablet, Rfl: 0  •  tobramycin in sterile water (preservative free) injection-balanced salts ophthalmic solution, Apply 1-2 drops to eye(s) as directed by provider Every 4 (Four) Hours., Disp: 5 mL, Rfl: 0  •  traZODone (DESYREL) 50 MG tablet, Take 50 mg by mouth Every Night., Disp: , Rfl:   •  vitamin B-12 (CYANOCOBALAMIN) 100 MCG tablet, Take 50 mcg by mouth Daily., Disp: , Rfl:      Allergies:  No Known Allergies    Objective     Vital Signs:   Vitals:    12/06/19 1501   BP: 112/64   Pulse: 79   Temp: 98.3 °F (36.8 °C)   SpO2: 98%   Weight: 69.9 kg (154 lb)   Height: 157.5 cm (62\")       Physical Exam:   General Appearance:    Alert, cooperative, in no acute distress   Head:    Normocephalic, without obvious abnormality, atraumatic   Eyes:            Lids and lashes normal, conjunctivae and sclerae normal, no   icterus, no pallor, corneas clear, PERRL   Ears:    Ears appear intact with no abnormalities noted   Throat:   No oral lesions, no thrush, oral mucosa moist   Neck:   No adenopathy, supple, trachea midline, no thyromegaly,  no JVD   Lungs/respiratory:     Clear to auscultation,respirations regular, even and                  unlabored    Heart/cardiovascular:    Regular rhythm and normal rate, normal S1 and S2, no            murmur   Abdomen:     no masses, no " organomegaly, soft and tender in the right upper quadrant with guarding and rebound.  No abdominal wall hernias.   Extremities:   Moves all extremities well, no edema, no cyanosis, no             redness   Pulses:   Pulses palpable and equal bilaterally   Skin:   No bleeding, bruising or rash   Lymph nodes:   No palpable adenopathy   Neurologic:   Cranial nerves 2 - 12 grossly intact, sensation intact   Psychiatric: No evidence of depression or anxiety      Results Review:   I reviewed the patient's new clinical results.    Review of Systems was reviewed and confirmed as accurate.    Assessment/Plan :    1. Biliary dyskinesia      Patient appears to have classic biliary colic symptoms.  Symptoms reproduced with fatty foods.  Pain reproduced by Kinevac injection.  She is agreeable to undergo laparoscopic cholecystectomy.  Risk of bleeding infection and possible bile leak discussed and patient and mother agreeable.  She knows there is 85% chance her symptoms will resolve with surgery.    Electronically signed by Javan Eddy MD  12/06/19  3:02 PM    Portions of this note has been scribed for Javan Eddy MD by Samanta Zhou. 12/6/2019  3:43 PM

## 2019-12-09 ENCOUNTER — TELEPHONE (OUTPATIENT)
Dept: SURGERY | Facility: CLINIC | Age: 19
End: 2019-12-09

## 2019-12-10 ENCOUNTER — APPOINTMENT (OUTPATIENT)
Dept: PREADMISSION TESTING | Facility: HOSPITAL | Age: 19
End: 2019-12-10

## 2019-12-10 VITALS — WEIGHT: 154 LBS | BODY MASS INDEX: 28.34 KG/M2 | HEIGHT: 62 IN

## 2019-12-10 NOTE — TELEPHONE ENCOUNTER
Patient will see Dr Eddy  12/16/19 for post opt visit and he will determine when patient can go back to work

## 2019-12-13 ENCOUNTER — ANESTHESIA (OUTPATIENT)
Dept: PERIOP | Facility: HOSPITAL | Age: 19
End: 2019-12-13

## 2019-12-13 ENCOUNTER — ANESTHESIA EVENT (OUTPATIENT)
Dept: PERIOP | Facility: HOSPITAL | Age: 19
End: 2019-12-13

## 2019-12-13 ENCOUNTER — APPOINTMENT (OUTPATIENT)
Dept: GENERAL RADIOLOGY | Facility: HOSPITAL | Age: 19
End: 2019-12-13

## 2019-12-13 ENCOUNTER — HOSPITAL ENCOUNTER (OUTPATIENT)
Facility: HOSPITAL | Age: 19
Setting detail: HOSPITAL OUTPATIENT SURGERY
Discharge: HOME | End: 2019-12-13
Attending: SURGERY | Admitting: SURGERY

## 2019-12-13 VITALS
HEART RATE: 66 BPM | TEMPERATURE: 97.6 F | RESPIRATION RATE: 16 BRPM | DIASTOLIC BLOOD PRESSURE: 57 MMHG | SYSTOLIC BLOOD PRESSURE: 96 MMHG | OXYGEN SATURATION: 100 %

## 2019-12-13 DIAGNOSIS — K82.8 BILIARY DYSKINESIA: ICD-10-CM

## 2019-12-13 LAB
B-HCG UR QL: NEGATIVE
INTERNAL NEGATIVE CONTROL: NEGATIVE
INTERNAL POSITIVE CONTROL: POSITIVE
Lab: NORMAL

## 2019-12-13 PROCEDURE — 25010000002 DEXAMETHASONE PER 1 MG: Performed by: NURSE ANESTHETIST, CERTIFIED REGISTERED

## 2019-12-13 PROCEDURE — 25010000002 ONDANSETRON PER 1 MG: Performed by: NURSE ANESTHETIST, CERTIFIED REGISTERED

## 2019-12-13 PROCEDURE — 25010000002 PROPOFOL 200 MG/20ML EMULSION: Performed by: NURSE ANESTHETIST, CERTIFIED REGISTERED

## 2019-12-13 PROCEDURE — 25010000002 IOPAMIDOL 61 % SOLUTION: Performed by: SURGERY

## 2019-12-13 PROCEDURE — 47563 LAPARO CHOLECYSTECTOMY/GRAPH: CPT | Performed by: SURGERY

## 2019-12-13 PROCEDURE — 81025 URINE PREGNANCY TEST: CPT | Performed by: SURGERY

## 2019-12-13 PROCEDURE — 94799 UNLISTED PULMONARY SVC/PX: CPT

## 2019-12-13 PROCEDURE — 74300 X-RAY BILE DUCTS/PANCREAS: CPT

## 2019-12-13 PROCEDURE — 25010000002 MIDAZOLAM PER 1MG: Performed by: NURSE ANESTHETIST, CERTIFIED REGISTERED

## 2019-12-13 PROCEDURE — 25010000002 FENTANYL CITRATE (PF) 100 MCG/2ML SOLUTION: Performed by: NURSE ANESTHETIST, CERTIFIED REGISTERED

## 2019-12-13 PROCEDURE — 25010000002 HYDROMORPHONE 1 MG/ML SOLUTION

## 2019-12-13 PROCEDURE — 25010000003 AMPICILLIN-SULBACTAM PER 1.5 G: Performed by: SURGERY

## 2019-12-13 PROCEDURE — 25010000002 HYDROMORPHONE PER 4 MG: Performed by: NURSE ANESTHETIST, CERTIFIED REGISTERED

## 2019-12-13 RX ORDER — ALBUTEROL SULFATE 2.5 MG/3ML
2.5 SOLUTION RESPIRATORY (INHALATION) ONCE AS NEEDED
Status: DISCONTINUED | OUTPATIENT
Start: 2019-12-13 | End: 2019-12-13 | Stop reason: HOSPADM

## 2019-12-13 RX ORDER — NEOSTIGMINE METHYLSULFATE 5 MG/5 ML
SYRINGE (ML) INTRAVENOUS AS NEEDED
Status: DISCONTINUED | OUTPATIENT
Start: 2019-12-13 | End: 2019-12-13 | Stop reason: SURG

## 2019-12-13 RX ORDER — SODIUM CHLORIDE 9 MG/ML
100 INJECTION, SOLUTION INTRAVENOUS CONTINUOUS
Status: DISCONTINUED | OUTPATIENT
Start: 2019-12-13 | End: 2019-12-13 | Stop reason: HOSPADM

## 2019-12-13 RX ORDER — PROMETHAZINE HYDROCHLORIDE 25 MG/ML
6.25 INJECTION, SOLUTION INTRAMUSCULAR; INTRAVENOUS ONCE AS NEEDED
Status: DISCONTINUED | OUTPATIENT
Start: 2019-12-13 | End: 2019-12-13 | Stop reason: HOSPADM

## 2019-12-13 RX ORDER — PROPOFOL 10 MG/ML
INJECTION, EMULSION INTRAVENOUS AS NEEDED
Status: DISCONTINUED | OUTPATIENT
Start: 2019-12-13 | End: 2019-12-13 | Stop reason: SURG

## 2019-12-13 RX ORDER — HYDROMORPHONE HCL 110MG/55ML
PATIENT CONTROLLED ANALGESIA SYRINGE INTRAVENOUS AS NEEDED
Status: DISCONTINUED | OUTPATIENT
Start: 2019-12-13 | End: 2019-12-13 | Stop reason: SURG

## 2019-12-13 RX ORDER — KETAMINE HCL IN NACL, ISO-OSM 100MG/10ML
SYRINGE (ML) INJECTION AS NEEDED
Status: DISCONTINUED | OUTPATIENT
Start: 2019-12-13 | End: 2019-12-13 | Stop reason: SURG

## 2019-12-13 RX ORDER — PROMETHAZINE HYDROCHLORIDE 25 MG/ML
12.5 INJECTION, SOLUTION INTRAMUSCULAR; INTRAVENOUS ONCE AS NEEDED
Status: DISCONTINUED | OUTPATIENT
Start: 2019-12-13 | End: 2019-12-13 | Stop reason: HOSPADM

## 2019-12-13 RX ORDER — DEXAMETHASONE SODIUM PHOSPHATE 4 MG/ML
INJECTION, SOLUTION INTRA-ARTICULAR; INTRALESIONAL; INTRAMUSCULAR; INTRAVENOUS; SOFT TISSUE AS NEEDED
Status: DISCONTINUED | OUTPATIENT
Start: 2019-12-13 | End: 2019-12-13 | Stop reason: SURG

## 2019-12-13 RX ORDER — PROMETHAZINE HYDROCHLORIDE 25 MG/1
25 TABLET ORAL ONCE AS NEEDED
Status: DISCONTINUED | OUTPATIENT
Start: 2019-12-13 | End: 2019-12-13 | Stop reason: HOSPADM

## 2019-12-13 RX ORDER — MAGNESIUM HYDROXIDE 1200 MG/15ML
LIQUID ORAL AS NEEDED
Status: DISCONTINUED | OUTPATIENT
Start: 2019-12-13 | End: 2019-12-13 | Stop reason: HOSPADM

## 2019-12-13 RX ORDER — SCOLOPAMINE TRANSDERMAL SYSTEM 1 MG/1
1 PATCH, EXTENDED RELEASE TRANSDERMAL CONTINUOUS
Status: DISCONTINUED | OUTPATIENT
Start: 2019-12-13 | End: 2019-12-13 | Stop reason: HOSPADM

## 2019-12-13 RX ORDER — PROMETHAZINE HYDROCHLORIDE 25 MG/1
25 SUPPOSITORY RECTAL ONCE AS NEEDED
Status: DISCONTINUED | OUTPATIENT
Start: 2019-12-13 | End: 2019-12-13 | Stop reason: HOSPADM

## 2019-12-13 RX ORDER — MIDAZOLAM HYDROCHLORIDE 2 MG/2ML
2 INJECTION, SOLUTION INTRAMUSCULAR; INTRAVENOUS
Status: DISCONTINUED | OUTPATIENT
Start: 2019-12-13 | End: 2019-12-13 | Stop reason: HOSPADM

## 2019-12-13 RX ORDER — FENTANYL CITRATE 50 UG/ML
INJECTION, SOLUTION INTRAMUSCULAR; INTRAVENOUS AS NEEDED
Status: DISCONTINUED | OUTPATIENT
Start: 2019-12-13 | End: 2019-12-13 | Stop reason: SURG

## 2019-12-13 RX ORDER — SODIUM CHLORIDE 0.9 % (FLUSH) 0.9 %
10 SYRINGE (ML) INJECTION AS NEEDED
Status: DISCONTINUED | OUTPATIENT
Start: 2019-12-13 | End: 2019-12-13 | Stop reason: HOSPADM

## 2019-12-13 RX ORDER — ROCURONIUM BROMIDE 10 MG/ML
INJECTION, SOLUTION INTRAVENOUS AS NEEDED
Status: DISCONTINUED | OUTPATIENT
Start: 2019-12-13 | End: 2019-12-13 | Stop reason: SURG

## 2019-12-13 RX ORDER — ONDANSETRON 2 MG/ML
4 INJECTION INTRAMUSCULAR; INTRAVENOUS ONCE AS NEEDED
Status: DISCONTINUED | OUTPATIENT
Start: 2019-12-13 | End: 2019-12-13 | Stop reason: HOSPADM

## 2019-12-13 RX ORDER — LIDOCAINE HYDROCHLORIDE 20 MG/ML
INJECTION, SOLUTION INTRAVENOUS AS NEEDED
Status: DISCONTINUED | OUTPATIENT
Start: 2019-12-13 | End: 2019-12-13 | Stop reason: SURG

## 2019-12-13 RX ORDER — HYDROCODONE BITARTRATE AND ACETAMINOPHEN 5; 325 MG/1; MG/1
1-2 TABLET ORAL EVERY 4 HOURS PRN
Qty: 12 TABLET | Refills: 0 | OUTPATIENT
Start: 2019-12-13 | End: 2021-11-09

## 2019-12-13 RX ORDER — MIDAZOLAM HYDROCHLORIDE 2 MG/2ML
1 INJECTION, SOLUTION INTRAMUSCULAR; INTRAVENOUS
Status: DISCONTINUED | OUTPATIENT
Start: 2019-12-13 | End: 2019-12-13 | Stop reason: HOSPADM

## 2019-12-13 RX ORDER — BUPIVACAINE HYDROCHLORIDE 5 MG/ML
INJECTION, SOLUTION EPIDURAL; INTRACAUDAL AS NEEDED
Status: DISCONTINUED | OUTPATIENT
Start: 2019-12-13 | End: 2019-12-13 | Stop reason: HOSPADM

## 2019-12-13 RX ORDER — ONDANSETRON 2 MG/ML
INJECTION INTRAMUSCULAR; INTRAVENOUS AS NEEDED
Status: DISCONTINUED | OUTPATIENT
Start: 2019-12-13 | End: 2019-12-13 | Stop reason: SURG

## 2019-12-13 RX ADMIN — FENTANYL CITRATE 50 MCG: 50 INJECTION INTRAMUSCULAR; INTRAVENOUS at 07:38

## 2019-12-13 RX ADMIN — SODIUM CHLORIDE 100 ML/HR: 9 INJECTION, SOLUTION INTRAVENOUS at 07:11

## 2019-12-13 RX ADMIN — ROCURONIUM BROMIDE 30 MG: 10 INJECTION INTRAVENOUS at 07:29

## 2019-12-13 RX ADMIN — ONDANSETRON 4 MG: 2 INJECTION INTRAMUSCULAR; INTRAVENOUS at 07:24

## 2019-12-13 RX ADMIN — DEXAMETHASONE SODIUM PHOSPHATE 8 MG: 4 INJECTION, SOLUTION INTRAMUSCULAR; INTRAVENOUS at 07:24

## 2019-12-13 RX ADMIN — FENTANYL CITRATE 50 MCG: 50 INJECTION INTRAMUSCULAR; INTRAVENOUS at 07:29

## 2019-12-13 RX ADMIN — Medication 0.5 MG: at 08:40

## 2019-12-13 RX ADMIN — HYDROMORPHONE HYDROCHLORIDE 0.5 MG: 1 INJECTION, SOLUTION INTRAMUSCULAR; INTRAVENOUS; SUBCUTANEOUS at 08:20

## 2019-12-13 RX ADMIN — HYDROMORPHONE HYDROCHLORIDE 0.25 MG: 2 INJECTION, SOLUTION INTRAMUSCULAR; INTRAVENOUS; SUBCUTANEOUS at 07:49

## 2019-12-13 RX ADMIN — Medication 15 MG: at 07:33

## 2019-12-13 RX ADMIN — Medication 0.5 MG: at 08:20

## 2019-12-13 RX ADMIN — Medication 2 MG: at 08:04

## 2019-12-13 RX ADMIN — PROPOFOL 200 MG: 10 INJECTION, EMULSION INTRAVENOUS at 07:29

## 2019-12-13 RX ADMIN — SCOPALAMINE 1 PATCH: 1 PATCH, EXTENDED RELEASE TRANSDERMAL at 07:12

## 2019-12-13 RX ADMIN — SODIUM CHLORIDE 3 G: 9 INJECTION, SOLUTION INTRAVENOUS at 07:31

## 2019-12-13 RX ADMIN — SODIUM CHLORIDE: 9 INJECTION, SOLUTION INTRAVENOUS at 08:09

## 2019-12-13 RX ADMIN — GLYCOPYRROLATE 0.2 MG: 0.2 INJECTION, SOLUTION INTRAMUSCULAR; INTRAVITREAL at 08:04

## 2019-12-13 RX ADMIN — LIDOCAINE HYDROCHLORIDE 100 MG: 20 INJECTION, SOLUTION INTRAVENOUS at 07:29

## 2019-12-13 RX ADMIN — MIDAZOLAM HYDROCHLORIDE 2 MG: 1 INJECTION, SOLUTION INTRAMUSCULAR; INTRAVENOUS at 07:12

## 2019-12-13 RX ADMIN — HYDROMORPHONE HYDROCHLORIDE 0.5 MG: 1 INJECTION, SOLUTION INTRAMUSCULAR; INTRAVENOUS; SUBCUTANEOUS at 08:40

## 2019-12-13 NOTE — DISCHARGE INSTRUCTIONS
To assist you in voiding:  Drink plenty of fluids  Listen to running water while attempting to void.    If you are unable to urinate and you have an uncomfortable urge to void or it has been   6 hours since you were discharged, return to the Emergency Room.    Rest today  No pushing,pulling,tugging,heavy lifting, or strenuous activity   No major decision making,driving,or drinking alcoholic beverages for 24 hours due to the sedation you received  Always use good hand hygiene/washing technique  No driving on pain medication.

## 2019-12-13 NOTE — ANESTHESIA POSTPROCEDURE EVALUATION
Patient: Raquel Guzman    Procedure Summary     Date:  12/13/19 Room / Location:  [facility] OR 4 /  [facility]    Anesthesia Start:  0723 Anesthesia Stop:  0820    Procedure:  CHOLECYSTECTOMY LAPAROSCOPIC INTRAOPERATIVE CHOLANGIOGRAPHY 5 CLIPPER (N/A Abdomen) Diagnosis:       Biliary dyskinesia      (Biliary dyskinesia [K82.8])    Surgeon:  Javan Eddy MD Provider:  Randall Estrella CRNA    Anesthesia Type:  general ASA Status:  2          Anesthesia Type: general    Vitals  Vitals Value Taken Time   /59 12/13/2019  9:10 AM   Temp 97.8 °F (36.6 °C) 12/13/2019  9:10 AM   Pulse 57 12/13/2019  9:13 AM   Resp 17 12/13/2019  9:10 AM   SpO2 100 % 12/13/2019  9:13 AM   Vitals shown include unvalidated device data.        Post Anesthesia Care and Evaluation    Patient location during evaluation: PACU  Patient participation: complete - patient participated  Level of consciousness: awake and responsive to verbal stimuli  Pain score: 0  Pain management: satisfactory to patient  Airway patency: patent  Anesthetic complications: No anesthetic complications    Cardiovascular status: acceptable and hemodynamically stable  Respiratory status: acceptable  Hydration status: acceptable

## 2019-12-13 NOTE — ANESTHESIA PROCEDURE NOTES
Airway  Urgency: elective    Date/Time: 12/13/2019 7:30 AM  Airway not difficult    General Information and Staff    Patient location during procedure: OR  CRNA: Randall Estrella CRNA    Indications and Patient Condition  Indications for airway management: airway protection    Preoxygenated: yes  Mask difficulty assessment: 0 - not attempted    Final Airway Details  Final airway type: endotracheal airway      Successful airway: ETT  Cuffed: yes   Successful intubation technique: direct laryngoscopy  Facilitating devices/methods: cricoid pressure  Endotracheal tube insertion site: oral  Blade: Ketan  Blade size: 3  ETT size (mm): 7.0  Cormack-Lehane Classification: grade IIa - partial view of glottis  Placement verified by: chest auscultation and capnometry   Measured from: lips  Number of attempts at approach: 1

## 2019-12-13 NOTE — ANESTHESIA PREPROCEDURE EVALUATION
Anesthesia Evaluation     Patient summary reviewed and Nursing notes reviewed   NPO Solid Status: > 8 hours  NPO Liquid Status: > 8 hours           Airway   Mallampati: I  TM distance: >3 FB  Neck ROM: full  No difficulty expected  Dental - normal exam     Pulmonary - normal exam   (+) asthma,  Cardiovascular - negative cardio ROS and normal exam  Exercise tolerance: excellent (>7 METS)        Neuro/Psych  (+) headaches, psychiatric history Depression, Bipolar and Anxiety,     GI/Hepatic/Renal/Endo    (+)  GERD,      Musculoskeletal (-) negative ROS    Abdominal  - normal exam    Bowel sounds: normal.   Substance History - negative use     OB/GYN negative ob/gyn ROS         Other                        Anesthesia Plan    ASA 2     general     intravenous induction     Anesthetic plan, all risks, benefits, and alternatives have been provided, discussed and informed consent has been obtained with: patient.    Plan discussed with attending.

## 2019-12-13 NOTE — OP NOTE
PATIENT:     Raquel Guzman    DATE OF SURGERY:     12/13/2019    PHYSICIAN:   Javan Eddy MD    REFERRING PHYSICIAN:  Kat Yi APRN    YOB: 2000    PREOPERATIVE DIAGNOSIS:  Chronic cholecystitis due to biliary dyskinesia    POSTOPERATIVE DIAGNOSIS:  Chronic cholecystitis due to biliary dyskinesia    PROCEDURE:  Laparoscopic cholecystectomy with intraoperative cholangiography.    ANESTHESIA:  General endotracheal.    HISTORY:  The patient was sent to me as a consultation via Kat Yi APRN for evaluation and treatment of chronic right upper quadrant and mid epigastric abdominal discomfort.  Workup was begun and the patient was subsequently found to have chronic cholecystitis due to biliary dyskinesia.  The patient is here now today for elective laparoscopic cholecystectomy with intraoperative cholangiography for treatment of chronic cholecystitis.  The procedure was discussed with the patient preoperatively who understands, agrees, and wishes to proceed with the above-mentioned procedure in an elective outpatient fashion.      OPERATIVE PROCEDURE:  The patient was taken to the operating room, placed in the supine position, and given general endotracheal anesthesia.  The patient was prepped and draped in the normal sterile fashion, and also received preoperative IV antibiotics.  An appropriate timeout was performed by the nursing staff prior to the incision.  I did discuss the situation with the patient preoperatively.      An umbilical incision was then made to insert a Veress needle to insufflate the abdomen with carbon dioxide, and a separate 5 mm port was inserted here along with a camera via an Optiview.  A separate subxiphoid port was inserted along with two right subcostal 5 mm ports.  The gallbladder was well visualized.    Good exposure was obtained, and the gallbladder was grasped at its infundibulum and its fundus and retracted superiorly and laterally.  There  were some chronic attachments of fatty tissue to the gallbladder indicative of chronic cholecystitis and these were easily taken down.    Good exposure was obtained and dissection was performed in the triangle of Calot, and the cystic duct and cystic artery were identified in the normal manner.  A clip was then placed on the cystic duct proximally and then two clips were placed on the cystic artery proximally and one distally.  Cystic ductotomy was performed.  A separate cholangiogram catheter had been inserted through a right upper quadrant introducer port and then this was fed into the cystic duct itself and a clip was applied.     Intraoperative cholangiography was then performed under fluoroscopy, carefully evaluating the biliary ductal anatomy.  This was done without difficulty.  The right and left hepatic ducts were well visualized as well as the common hepatic duct.  The common bile duct was well visualized, as was the duodenum.  There was nice flow into the duodenum and there was no evidence of biliary ductal obstruction or choledocholithiasis.  There was ample distance between the cystic ductotomy and the cystic duct/common duct junction.  I did feel comfortable performing the procedure laparoscopically.    The cholangiogram catheter was removed.  The cystic duct was clipped twice distally and then divided, as was the cystic artery.  Bovie electrocautery was then used to remove the gallbladder from the liver bed.  It was then removed via the subxiphoid port site without difficulty.     Copious irrigation was used in the patient’s abdominal cavity.  It was clean and dry at this point.  Hemostasis was intact and there was no evidence of bilious leakage.  All trocar sites were injected with a local anesthetic mixture.  Trocars were removed under direct vision and the fascia was closed with 0-Vicryl suture and 4-0 Vicryl subcuticular stitch along with Steri-Strips for skin reapproximation.      The patient was  stable at this point and subsequently transferred back to the recovery room in stable condition.    Javan Eddy MD

## 2019-12-16 ENCOUNTER — OFFICE VISIT (OUTPATIENT)
Dept: SURGERY | Facility: CLINIC | Age: 19
End: 2019-12-16

## 2019-12-16 VITALS
HEIGHT: 62 IN | OXYGEN SATURATION: 99 % | BODY MASS INDEX: 28.34 KG/M2 | TEMPERATURE: 98.8 F | WEIGHT: 154 LBS | HEART RATE: 76 BPM | SYSTOLIC BLOOD PRESSURE: 100 MMHG | DIASTOLIC BLOOD PRESSURE: 60 MMHG

## 2019-12-16 DIAGNOSIS — Z48.89 POSTOPERATIVE VISIT: Primary | ICD-10-CM

## 2019-12-16 PROCEDURE — 99024 POSTOP FOLLOW-UP VISIT: CPT | Performed by: SURGERY

## 2019-12-16 NOTE — PROGRESS NOTES
"Patient: Raquel Guzman    YOB: 2000    Date: 12/16/2019    Primary Care Provider: Kat Yi APRN    Chief Complaint   Patient presents with   • Post-op     PO lap lesly        History of present illness:  I saw the patient in the office today as a followup from their recent laparoscopic cholecystectomy.  They state that they have done well and are having no complaints.    Vital Signs:   Vitals:    12/16/19 1234   BP: 100/60   Pulse: 76   Temp: 98.8 °F (37.1 °C)   TempSrc: Temporal   SpO2: 99%   Weight: 69.9 kg (154 lb)   Height: 157.5 cm (62\")       Physical Exam:   General Appearance:    Alert, cooperative, in no acute distress   Abdomen:     no masses, no organomegaly, soft non-tender, non-distended, no guarding, wounds are well healed     Assessment / Plan :    1. Postoperative visit        I did discuss the situation with the patient today in the office and they have done well from their recent laparoscopic cholecystectomy.  I have released the patient back to normal activity, they understand that they need to be careful about heavy lifting.  I need to see the patient back in the office only if they are having further problems, they know to call me if they are.    Electronically signed by Javan Eddy MD  12/16/19                  "

## 2019-12-20 LAB
LAB AP CASE REPORT: NORMAL
PATH REPORT.FINAL DX SPEC: NORMAL

## 2020-01-14 ENCOUNTER — HOSPITAL ENCOUNTER (EMERGENCY)
Facility: HOSPITAL | Age: 20
Discharge: HOME | End: 2020-01-15
Attending: EMERGENCY MEDICINE | Admitting: EMERGENCY MEDICINE

## 2020-01-14 ENCOUNTER — APPOINTMENT (OUTPATIENT)
Dept: CT IMAGING | Facility: HOSPITAL | Age: 20
End: 2020-01-14

## 2020-01-14 DIAGNOSIS — G89.29 CHRONIC ABDOMINAL PAIN: Primary | ICD-10-CM

## 2020-01-14 DIAGNOSIS — R10.9 CHRONIC ABDOMINAL PAIN: Primary | ICD-10-CM

## 2020-01-14 LAB
ALBUMIN SERPL-MCNC: 4.4 G/DL (ref 3.5–5.2)
ALBUMIN/GLOB SERPL: 1.5 G/DL
ALP SERPL-CCNC: 98 U/L (ref 39–117)
ALT SERPL W P-5'-P-CCNC: 14 U/L (ref 1–33)
ANION GAP SERPL CALCULATED.3IONS-SCNC: 12.9 MMOL/L (ref 5–15)
AST SERPL-CCNC: 17 U/L (ref 1–32)
BASOPHILS # BLD AUTO: 0.09 10*3/MM3 (ref 0–0.2)
BASOPHILS NFR BLD AUTO: 0.5 % (ref 0–1.5)
BILIRUB SERPL-MCNC: 0.3 MG/DL (ref 0.2–1.2)
BILIRUB UR QL STRIP: NEGATIVE
BUN BLD-MCNC: 11 MG/DL (ref 6–20)
BUN/CREAT SERPL: 15.3 (ref 7–25)
CALCIUM SPEC-SCNC: 9.8 MG/DL (ref 8.6–10.5)
CHLORIDE SERPL-SCNC: 101 MMOL/L (ref 98–107)
CLARITY UR: ABNORMAL
CO2 SERPL-SCNC: 21.1 MMOL/L (ref 22–29)
COLOR UR: YELLOW
CREAT BLD-MCNC: 0.72 MG/DL (ref 0.57–1)
DEPRECATED RDW RBC AUTO: 35.2 FL (ref 37–54)
EOSINOPHIL # BLD AUTO: 3.99 10*3/MM3 (ref 0–0.4)
EOSINOPHIL NFR BLD AUTO: 21.2 % (ref 0.3–6.2)
ERYTHROCYTE [DISTWIDTH] IN BLOOD BY AUTOMATED COUNT: 11.1 % (ref 12.3–15.4)
GFR SERPL CREATININE-BSD FRML MDRD: 103 ML/MIN/1.73
GLOBULIN UR ELPH-MCNC: 3 GM/DL
GLUCOSE BLD-MCNC: 96 MG/DL (ref 65–99)
GLUCOSE UR STRIP-MCNC: NEGATIVE MG/DL
HCT VFR BLD AUTO: 40.9 % (ref 34–46.6)
HGB BLD-MCNC: 14.7 G/DL (ref 12–15.9)
HGB UR QL STRIP.AUTO: NEGATIVE
HOLD SPECIMEN: NORMAL
HOLD SPECIMEN: NORMAL
IMM GRANULOCYTES # BLD AUTO: 0.09 10*3/MM3 (ref 0–0.05)
IMM GRANULOCYTES NFR BLD AUTO: 0.5 % (ref 0–0.5)
KETONES UR QL STRIP: ABNORMAL
LEUKOCYTE ESTERASE UR QL STRIP.AUTO: NEGATIVE
LIPASE SERPL-CCNC: 24 U/L (ref 13–60)
LYMPHOCYTES # BLD AUTO: 3.93 10*3/MM3 (ref 0.7–3.1)
LYMPHOCYTES NFR BLD AUTO: 20.9 % (ref 19.6–45.3)
MCH RBC QN AUTO: 31.6 PG (ref 26.6–33)
MCHC RBC AUTO-ENTMCNC: 35.9 G/DL (ref 31.5–35.7)
MCV RBC AUTO: 88 FL (ref 79–97)
MONOCYTES # BLD AUTO: 1.08 10*3/MM3 (ref 0.1–0.9)
MONOCYTES NFR BLD AUTO: 5.7 % (ref 5–12)
NEUTROPHILS # BLD AUTO: 9.63 10*3/MM3 (ref 1.7–7)
NEUTROPHILS NFR BLD AUTO: 51.2 % (ref 42.7–76)
NITRITE UR QL STRIP: NEGATIVE
NRBC BLD AUTO-RTO: 0 /100 WBC (ref 0–0.2)
PH UR STRIP.AUTO: 5.5 [PH] (ref 5–8)
PLATELET # BLD AUTO: 234 10*3/MM3 (ref 140–450)
PMV BLD AUTO: 10.4 FL (ref 6–12)
POTASSIUM BLD-SCNC: 3.8 MMOL/L (ref 3.5–5.2)
PROT SERPL-MCNC: 7.4 G/DL (ref 6–8.5)
PROT UR QL STRIP: NEGATIVE
RBC # BLD AUTO: 4.65 10*6/MM3 (ref 3.77–5.28)
SODIUM BLD-SCNC: 135 MMOL/L (ref 136–145)
SP GR UR STRIP: >=1.03 (ref 1–1.03)
UROBILINOGEN UR QL STRIP: ABNORMAL
WBC NRBC COR # BLD: 18.81 10*3/MM3 (ref 3.4–10.8)
WHOLE BLOOD HOLD SPECIMEN: NORMAL
WHOLE BLOOD HOLD SPECIMEN: NORMAL

## 2020-01-14 PROCEDURE — 85025 COMPLETE CBC W/AUTO DIFF WBC: CPT | Performed by: EMERGENCY MEDICINE

## 2020-01-14 PROCEDURE — 96374 THER/PROPH/DIAG INJ IV PUSH: CPT

## 2020-01-14 PROCEDURE — 25010000002 KETOROLAC TROMETHAMINE PER 15 MG: Performed by: EMERGENCY MEDICINE

## 2020-01-14 PROCEDURE — 99283 EMERGENCY DEPT VISIT LOW MDM: CPT

## 2020-01-14 PROCEDURE — 74176 CT ABD & PELVIS W/O CONTRAST: CPT

## 2020-01-14 PROCEDURE — 80053 COMPREHEN METABOLIC PANEL: CPT | Performed by: EMERGENCY MEDICINE

## 2020-01-14 PROCEDURE — 83690 ASSAY OF LIPASE: CPT | Performed by: EMERGENCY MEDICINE

## 2020-01-14 PROCEDURE — 96375 TX/PRO/DX INJ NEW DRUG ADDON: CPT

## 2020-01-14 PROCEDURE — 81003 URINALYSIS AUTO W/O SCOPE: CPT | Performed by: EMERGENCY MEDICINE

## 2020-01-14 RX ORDER — SUCRALFATE 1 G/1
1 TABLET ORAL 4 TIMES DAILY
Qty: 30 TABLET | Refills: 0 | OUTPATIENT
Start: 2020-01-14 | End: 2021-11-09

## 2020-01-14 RX ORDER — SODIUM CHLORIDE 0.9 % (FLUSH) 0.9 %
10 SYRINGE (ML) INJECTION AS NEEDED
Status: DISCONTINUED | OUTPATIENT
Start: 2020-01-14 | End: 2020-01-15 | Stop reason: HOSPADM

## 2020-01-14 RX ORDER — KETOROLAC TROMETHAMINE 30 MG/ML
30 INJECTION, SOLUTION INTRAMUSCULAR; INTRAVENOUS ONCE
Status: COMPLETED | OUTPATIENT
Start: 2020-01-14 | End: 2020-01-14

## 2020-01-14 RX ORDER — FAMOTIDINE 10 MG/ML
20 INJECTION, SOLUTION INTRAVENOUS ONCE
Status: COMPLETED | OUTPATIENT
Start: 2020-01-14 | End: 2020-01-14

## 2020-01-14 RX ADMIN — KETOROLAC TROMETHAMINE 30 MG: 30 INJECTION, SOLUTION INTRAMUSCULAR at 23:40

## 2020-01-14 RX ADMIN — LIDOCAINE HYDROCHLORIDE: 20 SOLUTION ORAL; TOPICAL at 22:11

## 2020-01-14 RX ADMIN — FAMOTIDINE 20 MG: 10 INJECTION, SOLUTION INTRAVENOUS at 22:12

## 2020-01-15 VITALS
SYSTOLIC BLOOD PRESSURE: 107 MMHG | WEIGHT: 145 LBS | TEMPERATURE: 98.1 F | HEIGHT: 61 IN | RESPIRATION RATE: 18 BRPM | DIASTOLIC BLOOD PRESSURE: 75 MMHG | BODY MASS INDEX: 27.38 KG/M2 | HEART RATE: 72 BPM | OXYGEN SATURATION: 98 %
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
## 2020-01-17 ENCOUNTER — OFFICE VISIT (OUTPATIENT)
Dept: SURGERY | Facility: CLINIC | Age: 20
End: 2020-01-17

## 2020-01-17 VITALS
BODY MASS INDEX: 28.55 KG/M2 | WEIGHT: 151.2 LBS | HEIGHT: 61 IN | TEMPERATURE: 99.6 F | OXYGEN SATURATION: 99 % | SYSTOLIC BLOOD PRESSURE: 100 MMHG | HEART RATE: 66 BPM | DIASTOLIC BLOOD PRESSURE: 60 MMHG

## 2020-01-17 DIAGNOSIS — K29.30 CHRONIC SUPERFICIAL GASTRITIS WITHOUT BLEEDING: ICD-10-CM

## 2020-01-17 DIAGNOSIS — R10.13 EPIGASTRIC PAIN: Primary | ICD-10-CM

## 2020-01-17 PROCEDURE — 99213 OFFICE O/P EST LOW 20 MIN: CPT | Performed by: SURGERY

## 2020-01-28 ENCOUNTER — OUTSIDE FACILITY SERVICE (OUTPATIENT)
Dept: SURGERY | Facility: CLINIC | Age: 20
End: 2020-01-28

## 2020-01-28 PROCEDURE — 43239 EGD BIOPSY SINGLE/MULTIPLE: CPT | Performed by: SURGERY

## 2020-01-29 RX ORDER — NORETHINDRONE ACETATE AND ETHINYL ESTRADIOL AND FERROUS FUMARATE 1MG-20(21)
KIT ORAL
Qty: 28 TABLET | Refills: 2 | OUTPATIENT
Start: 2020-01-29

## 2020-02-03 NOTE — PROGRESS NOTES
Patient: Raquel Guzman    YOB: 2000    Date: 02/05/2020    Primary Care Provider: Kat Yi APRN    Reason for Consultation: Follow-up EGD    Chief Complaint   Patient presents with   • Follow-up     EGD       History of present illness:  I saw the patient in the office today as a followup from their recent EGD with biopsy, the pathology report did show duodenal type mucosa, mild chronic inactive gastritis and no bacteria. Patient is status post cholecystectomy that was performed in December 2019.  She still complains of mid abdominal pain and vomiting.  Patient has mild nausea with pain in the epigastric region.  Has been on omeprazole for a month with minimal improvement.  No associated with certain foods.    The following portions of the patient's history were reviewed and updated as appropriate: allergies, current medications, past family history, past medical history, past social history, past surgical history and problem list.    Review of Systems   Constitutional: Negative for chills, fever and unexpected weight change.   HENT: Negative for hearing loss, trouble swallowing and voice change.    Eyes: Negative for visual disturbance.   Respiratory: Negative for apnea, cough, chest tightness, shortness of breath and wheezing.    Cardiovascular: Negative for chest pain, palpitations and leg swelling.   Gastrointestinal: Negative for abdominal distention, abdominal pain, anal bleeding, blood in stool, constipation, diarrhea, nausea, rectal pain and vomiting.   Endocrine: Negative for cold intolerance and heat intolerance.   Genitourinary: Negative for difficulty urinating, dysuria and flank pain.   Musculoskeletal: Negative for back pain and gait problem.   Skin: Negative for color change, rash and wound.   Neurological: Negative for dizziness, syncope, speech difficulty, weakness, light-headedness, numbness and headaches.   Hematological: Negative for adenopathy. Does not bruise/bleed  "easily.   Psychiatric/Behavioral: Negative for confusion. The patient is not nervous/anxious.        Vital Signs:   Vitals:    02/05/20 0926   BP: 110/72   Pulse: 82   Temp: 97.9 °F (36.6 °C)   TempSrc: Temporal   SpO2: 99%   Weight: 68.6 kg (151 lb 3.8 oz)   Height: 154.9 cm (60.98\")       Allergies:  No Known Allergies    Medications:    Current Outpatient Medications:   •  buPROPion XL (WELLBUTRIN XL) 300 MG 24 hr tablet, Take 300 mg by mouth Daily., Disp: , Rfl:   •  dicyclomine (BENTYL) 20 MG tablet, Take 1 tablet by mouth Every 6 (Six) Hours As Needed (abdominal cramps)., Disp: 20 tablet, Rfl: 0  •  HYDROcodone-acetaminophen (NORCO) 5-325 MG per tablet, Take 1-2 tablets by mouth Every 4 (Four) Hours As Needed for Pain., Disp: 12 tablet, Rfl: 0  •  hydrOXYzine pamoate (VISTARIL) 25 MG capsule, Take 25 mg by mouth 3 (Three) Times a Day As Needed for Itching., Disp: , Rfl:   •  lamoTRIgine (LaMICtal) 100 MG tablet, Take 100 mg by mouth Daily., Disp: , Rfl:   •  levothyroxine (SYNTHROID, LEVOTHROID) 25 MCG tablet, Take 25 mcg by mouth Daily., Disp: , Rfl:   •  lurasidone (LATUDA) 40 MG tablet tablet, Take 40 mg by mouth Daily., Disp: , Rfl:   •  norethindrone-ethinyl estradiol FE (JUNEL FE 1/20) 1-20 MG-MCG per tablet, Take 1 tablet by mouth Daily., Disp: 28 tablet, Rfl: 2  •  omeprazole (priLOSEC) 20 MG capsule, Take 20 mg by mouth Daily., Disp: , Rfl: 0  •  ondansetron ODT (ZOFRAN-ODT) 4 MG disintegrating tablet, Take 1 tablet by mouth Every 6 (Six) Hours As Needed for Nausea or Vomiting., Disp: 20 tablet, Rfl: 0  •  ondansetron ODT (ZOFRAN-ODT) 4 MG disintegrating tablet, Take 1 tablet by mouth Every 8 (Eight) Hours As Needed for Nausea or Vomiting., Disp: 10 tablet, Rfl: 0  •  pantoprazole (PROTONIX) 20 MG EC tablet, Take 1 tablet by mouth Daily., Disp: 30 tablet, Rfl: 0  •  polyethylene glycol (MIRALAX) packet, Take 17 g by mouth 2 (Two) Times a Day. Until BM's are consistency of peanut butter, then take once " daily as needed, Disp: 12 packet, Rfl: 0  •  simethicone (GAS-X) 80 MG chewable tablet, Chew 1 tablet Every 6 (Six) Hours As Needed for Flatulence., Disp: 40 tablet, Rfl: 0  •  sucralfate (CARAFATE) 1 g tablet, Take 1 tablet by mouth 4 (Four) Times a Day., Disp: 40 tablet, Rfl: 0  •  sucralfate (CARAFATE) 1 g tablet, Take 1 tablet by mouth 4 (Four) Times a Day., Disp: 30 tablet, Rfl: 0  •  tobramycin in sterile water (preservative free) injection-balanced salts ophthalmic solution, Apply 1-2 drops to eye(s) as directed by provider Every 4 (Four) Hours., Disp: 5 mL, Rfl: 0  •  traZODone (DESYREL) 50 MG tablet, Take 50 mg by mouth Every Night., Disp: , Rfl:   •  vitamin B-12 (CYANOCOBALAMIN) 100 MCG tablet, Take 50 mcg by mouth Daily., Disp: , Rfl:     Physical Exam:   General Appearance:    Alert, cooperative, in no acute distress   Abdomen:     no masses, no organomegaly, soft with mild tenderness in epigastric region.  No rebound or guarding.   Chest:      Clear toausculation            Cor:  Regular rate and rhythm      Results Review:   I reviewed the patient's new clinical results.    Review of Systems was reviewed and confirmed as accurate as documented by the MA.    Assessment / Plan:    1. Epigastric pain    2. Gastroesophageal reflux disease with esophagitis        I did discuss the situation with the patient today in the office and they have done well from their recent EGD with biopsy. I have told the patient to go on a 1 week trial each of lactose-free diet and gluten-free diet.  Follow-up in 3 weeks.  If no improvement, will consider referral to [facility]..    Electronically signed by Javan Eddy MD  02/05/20    Portions of this note have been scribed for Javan Eddy MD by Michelle Milligan. 2/5/2020  10:17 AM

## 2020-02-05 ENCOUNTER — OFFICE VISIT (OUTPATIENT)
Dept: SURGERY | Facility: CLINIC | Age: 20
End: 2020-02-05

## 2020-02-05 VITALS
SYSTOLIC BLOOD PRESSURE: 110 MMHG | DIASTOLIC BLOOD PRESSURE: 72 MMHG | HEIGHT: 61 IN | HEART RATE: 82 BPM | OXYGEN SATURATION: 99 % | WEIGHT: 151.24 LBS | TEMPERATURE: 97.9 F | BODY MASS INDEX: 28.55 KG/M2

## 2020-02-05 DIAGNOSIS — K21.00 GASTROESOPHAGEAL REFLUX DISEASE WITH ESOPHAGITIS: ICD-10-CM

## 2020-02-05 DIAGNOSIS — R10.13 EPIGASTRIC PAIN: Primary | ICD-10-CM

## 2020-02-05 PROCEDURE — 99212 OFFICE O/P EST SF 10 MIN: CPT | Performed by: SURGERY

## 2020-10-30 ENCOUNTER — OFFICE VISIT (OUTPATIENT)
Dept: OBSTETRICS AND GYNECOLOGY | Facility: CLINIC | Age: 20
End: 2020-10-30

## 2020-10-30 VITALS
SYSTOLIC BLOOD PRESSURE: 108 MMHG | DIASTOLIC BLOOD PRESSURE: 68 MMHG | WEIGHT: 157 LBS | BODY MASS INDEX: 29.64 KG/M2 | HEIGHT: 61 IN

## 2020-10-30 DIAGNOSIS — N93.9 ABNORMAL UTERINE BLEEDING (AUB): Primary | ICD-10-CM

## 2020-10-30 PROCEDURE — 99213 OFFICE O/P EST LOW 20 MIN: CPT | Performed by: MIDWIFE
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
## 2020-11-10 ENCOUNTER — TREATMENT (OUTPATIENT)
Dept: PHYSICAL THERAPY | Facility: CLINIC | Age: 20
End: 2020-11-10

## 2020-11-10 DIAGNOSIS — S16.1XXS STRAIN OF NECK MUSCLE, SEQUELA: ICD-10-CM

## 2020-11-10 DIAGNOSIS — S46.912S STRAIN OF LEFT SHOULDER, SEQUELA: Primary | ICD-10-CM

## 2020-11-10 PROCEDURE — 97161 PT EVAL LOW COMPLEX 20 MIN: CPT | Performed by: PHYSICAL THERAPIST

## 2020-11-10 NOTE — PROGRESS NOTES
Physical Therapy Initial Evaluation and Plan of Care      Patient: Raquel Guzman   : 2000  Diagnosis/ICD-10 Code:  No primary diagnosis found.  Referring practitioner: Medardo Ferguson PA-C    Subjective Evaluation    History of Present Illness  Mechanism of injury: 2020 she wrecked a skateboard and hurt her L shoulder and neck.  She was in very bad pain for a week.     First MD visit for this was one month ago.      X ray only diagnostic.          Patient Occupation: [employer]  Pain  Current pain ratin  At worst pain rating: 10  Location: L anterior shoulder and the L neck.    Quality: tight, sharp and burning  Aggravating factors: movement, outstretched reach, overhead activity, lifting, sleeping and repetitive movement    Hand dominance: right    Patient Goals  Patient goals for therapy: return to work, increased motion, decreased pain, increased strength, independence with ADLs/IADLs and return to sport/leisure activities             Objective          Palpation   Left   Hypertonic in the levator scapulae, scalenes and upper trapezius.   Tenderness of the levator scapulae, rhomboids, scalenes and upper trapezius.     Neurological Testing     Sensation     Shoulder   Left Shoulder   Intact: light touch    Reflexes   Left   Biceps (C5/C6): normal (2+)  Brachioradialis (C6): normal (2+)  Triceps (C7): normal (2+)    Active Range of Motion   Cervical/Thoracic Spine   Cervical    Left lateral flexion: 24 degrees   Right lateral flexion: 20 degrees with pain  Left rotation: 31 degrees with pain  Right rotation: 59 degrees   Left Shoulder   Flexion: 125 degrees with pain  Abduction: 62 degrees with pain    Additional Active Range of Motion Details  Supine L shoulder flexion 135 degrees.     Strength/Myotome Testing     Left Shoulder     Planes of Motion   Flexion: 4   Abduction: 4   Adduction: 4   External rotation at 0°: 4   Internal rotation at 0°: 4     Left Wrist/Hand       (2nd hand position)     Trial 1: 44 lbs    Trial 2: 53 lbs    Trial 3: 54 lbs    Average: 50.33 lbs    Right Wrist/Hand      (2nd hand position)     Trial 1: 73 lbs          Assessment & Plan     Assessment  Impairments: abnormal muscle firing, abnormal muscle tone, abnormal or restricted ROM, activity intolerance, impaired physical strength, lacks appropriate home exercise program and pain with function  Assessment details: Patient is a 20 year old female who comes to physical therapy with cervical spine strain and shoulder strain. Signs and symptoms are consistent with strain in the scalenes and periscapular MM that seem to be referring pain to the L anterior shoulder.  The patient currently has pain, decreased ROM, decreased strength, and inability to perform all essential functional activities. Pt will benefit from skilled PT services to address the above issues.     Prognosis: fair  Prognosis details:   SHORT TERM GOALS:     4 weeks  1. Pt I w/ HEP  2. Pt to demonstrate PROM of the left shoulder to WFL to improve ability to perform ADL's  3. Pt to demonstrate ability to perform 30 minutes continuous activity in the clinic without increase in pain     LONG TERM GOALS:   6 weeks  1. Pt to demonstrate AROM of the left shoulder to WNL to allow ability to perform all necessary functional activities  2. Pt to demonstrate ability to lift 5# OH with the left arm without increase in pain  3. Pt to report being able to work full duty without increase in pain in the shoulder  4. Pt to tolerate 60 minutes continuous activity in the clinic without increase in pain     Functional Limitations: carrying objects, lifting, sleeping, pulling, pushing, uncomfortable because of pain, reaching behind back, reaching overhead and unable to perform repetitive tasks  Plan  Therapy options: will be seen for skilled physical therapy services  Planned modality interventions: cryotherapy, electrical stimulation/Russian stimulation,  thermotherapy (hydrocollator packs) and ultrasound  Planned therapy interventions: therapeutic activities, stretching, strengthening, soft tissue mobilization, postural training, motor coordination training, manual therapy, ADL retraining, body mechanics training, flexibility, functional ROM exercises, home exercise program, joint mobilization and IADL retraining  Duration in visits: 10  Treatment plan discussed with: patient  Plan details: Pt will be seen for PT1- 2 x/week for 3-6 weeks.         Manual Therapy:         mins  01240;  Therapeutic Exercise:         mins  84184;     Neuromuscular Ruy:        mins  09852;    Therapeutic Activity:          mins  68307;     Gait Training:           mins  66583;     Ultrasound:          mins  26300;    Electrical Stimulation:         mins  13766 ( );  Dry Needling          mins self-pay    Timed Treatment:      mins   Total Treatment:     34   mins    PT SIGNATURE: Jefferson Narvaez, PT   DATE TREATMENT INITIATED: 11/10/2020    Initial Certification  Certification Period: 2/8/2021  I certify that the therapy services are furnished while this patient is under my care.  The services outlined above are required by this patient, and will be reviewed every 90 days.     PHYSICIAN: Medardo Ferguson PA-C      DATE:     Please sign and return via fax to  .. Thank you, [facility].

## 2020-11-17 ENCOUNTER — TREATMENT (OUTPATIENT)
Dept: PHYSICAL THERAPY | Facility: CLINIC | Age: 20
End: 2020-11-17

## 2020-11-17 DIAGNOSIS — M54.2 CERVICALGIA: Primary | ICD-10-CM

## 2020-11-17 DIAGNOSIS — G54.0 BRACHIAL PLEXOPATHY: ICD-10-CM

## 2020-11-17 DIAGNOSIS — M25.512 CHRONIC LEFT SHOULDER PAIN: ICD-10-CM

## 2020-11-17 DIAGNOSIS — G89.29 CHRONIC LEFT SHOULDER PAIN: ICD-10-CM

## 2020-11-17 PROCEDURE — 97140 MANUAL THERAPY 1/> REGIONS: CPT | Performed by: PHYSICAL THERAPIST

## 2020-11-17 PROCEDURE — 97110 THERAPEUTIC EXERCISES: CPT | Performed by: PHYSICAL THERAPIST

## 2020-11-17 PROCEDURE — 97530 THERAPEUTIC ACTIVITIES: CPT | Performed by: PHYSICAL THERAPIST

## 2020-11-17 NOTE — PROGRESS NOTES
Physical Therapy Daily Progress Note    Patient: Raquel Guzman   : 2000  Diagnosis/ICD-10 Code:  Cervicalgia [M54.2]  Referring practitioner: Medardo Ferguson PA-C  Date of Initial Visit: Type: THERAPY  Noted: 11/10/2020  Today's Date: 2020  Patient seen for 2 sessions         Raquel Guzman reports that the exercises do improve her shoulder pain when it becomes more intense.  Has occasional tingling into the arm and fingers, but cannot recall with fingers it travels into.  States that she also headaches along the temporal and forehead region that are infrequent.  Neck feels stiff on the left, and has shoulder pain along the front of the left shoulder.  Has trouble sleeping due to pain.  Takes a sleep medication that allows for 3-4 hours of sleep, but wakes up with neck and shoulder pain.    Subjective     Objective   See Exercise, Manual, and Modality Logs for complete treatment.   *AROM C/S R rot/L rot/flx/ext:  30 deg/30 deg/45 deg/50 deg  *AROM GHJ ER/flx:  R->75 deg/170 deg; L->50 deg/160 deg  *(+) reproduction of tingling into L UE with sustained pressure applied to the BP at the scalene region    Assessment/Plan  Emphasis of visit was to improve C/S mobility and improve L GHJ mm activation.  Will continue to address C/S stiffness, decrease BP aggravation and gradual increase loading tolerance of the L GHJ.         Manual Therapy:    15     mins  97876;  Therapeutic Exercise:    25     mins  89602;     Neuromuscular Ruy:        mins  41140;    Therapeutic Activity:     15     mins  14283;     Gait Training:           mins  49769;     Ultrasound:          mins  78782;    Electrical Stimulation:         mins  68804 ( );  Dry Needling          mins self-pay    Timed Treatment:   55   mins   Total Treatment:     55   mins    Eladio Locke, GILMAR  Physical Therapist

## 2020-11-24 ENCOUNTER — TREATMENT (OUTPATIENT)
Dept: PHYSICAL THERAPY | Facility: CLINIC | Age: 20
End: 2020-11-24

## 2020-11-24 DIAGNOSIS — G54.0 BRACHIAL PLEXOPATHY: Primary | ICD-10-CM

## 2020-11-24 PROCEDURE — 97140 MANUAL THERAPY 1/> REGIONS: CPT | Performed by: PHYSICAL THERAPIST

## 2020-11-24 PROCEDURE — 97112 NEUROMUSCULAR REEDUCATION: CPT | Performed by: PHYSICAL THERAPIST

## 2020-11-24 PROCEDURE — 97110 THERAPEUTIC EXERCISES: CPT | Performed by: PHYSICAL THERAPIST

## 2020-11-24 NOTE — PROGRESS NOTES
Physical Therapy Daily Progress Note    Patient: Raquel Guzman   : 2000  Diagnosis/ICD-10 Code:  No primary diagnosis found.  Referring practitioner: Medardo Ferguson PA-C  Date of Initial Visit: No linked episodes  Today's Date: 2020  Patient seen for Visit count could not be calculated. Make sure you are using a visit which is associated with an episode. sessions         Raquel Guzman reports feeling less stiffness in her neck.  Had one bad headache along the forehead since last visit, while working.  Had one incident of tingling in the arm into the 4th finger that lasted one minute.  Has localized tenderness along the front of the left shoulder that she can reproduce by pressing on the the area.    Subjective     Objective   See Exercise, Manual, and Modality Logs for complete treatment.   *AROM C/S R rot/L rot/flx/ext:  50 deg/40 deg/45 deg/50 deg  *AROM GHJ ER/flx:  R->75 deg/170 deg; L->50 deg/160 deg  *(+) reproduction of tingling into L UE with sustained pressure applied to the BP at the scalene region    Assessment/Plan  Continued emphasis on reducing sensitivity along the BP pathway.  Combined with exercises to improve neural and soft tissue mobility of the L lower C/S quadrant.  Combined with facilitation of the L GHJ mm.    HEP:  1.)  Supine C/S rot (R->short arcs; L->full rotations)->avoiding radiculitis in the L UE  2.)  Supine elevation with band->working toward mild fatigue  3.)  Supine C/S OA flx->avoiding radiculitis in the L UE  4.)  B/L GHJ ER isotonics (green tb)->working toward mild fatigue       Manual Therapy:    15     mins  19489;  Therapeutic Exercise:    30     mins  50465;     Neuromuscular Ruy:    8    mins  77265;    Therapeutic Activity:          mins  84686;     Gait Training:           mins  76760;     Ultrasound:          mins  13284;    Electrical Stimulation:         mins  64453 ( );  Dry Needling          mins self-pay    Timed Treatment:   53   mins    Total Treatment:     53   mins    Eladio Locke, PT  Physical Therapist

## 2020-12-01 ENCOUNTER — TREATMENT (OUTPATIENT)
Dept: PHYSICAL THERAPY | Facility: CLINIC | Age: 20
End: 2020-12-01

## 2020-12-01 DIAGNOSIS — M54.2 CERVICALGIA: ICD-10-CM

## 2020-12-01 DIAGNOSIS — G54.0 BRACHIAL PLEXOPATHY: Primary | ICD-10-CM

## 2020-12-01 PROCEDURE — 97140 MANUAL THERAPY 1/> REGIONS: CPT | Performed by: PHYSICAL THERAPIST

## 2020-12-01 PROCEDURE — 97530 THERAPEUTIC ACTIVITIES: CPT | Performed by: PHYSICAL THERAPIST

## 2020-12-01 PROCEDURE — 97110 THERAPEUTIC EXERCISES: CPT | Performed by: PHYSICAL THERAPIST

## 2020-12-01 NOTE — PROGRESS NOTES
Physical Therapy Daily Progress Note    Patient Information  Raquel Guzman  2000      Visit # : 4    Raquel Guzman reports 3/10 pain today at rest.  L neck pain and shoulder blade area are the primary area of pain.  She reports she is feeling better.          Objective Pt presents to PT today with no distress noted at rest.     L UE fatigues quickly with activity.     Pt with relief from STM on the L UT and Scalene area.      See Exercise, Manual, and Modality Logs for complete treatment.     Assessment/Plan  Pt with improved ROM and less pain.  She is able to move her neck and arm better.        Progress per Plan of Care and Progress strengthening /stabilization /functional activity  Check response to stm and new active exercises.     Visit Diagnoses:    ICD-10-CM ICD-9-CM   1. Brachial plexopathy  G54.0 353.0   2. Cervicalgia  M54.2 723.1            Manual Therapy:    13     mins  82308;  Therapeutic Exercise:    31     mins  13326;     Neuromuscular Ruy:        mins  52652;    Therapeutic Activity:     11     mins  87723;     Gait Training:           mins  93076;     Ultrasound:          mins  23160;    Electrical Stimulation:         mins  27698 ( );  Dry Needling          mins self-pay    Timed Treatment:   55   mins   Total Treatment:     55   mins          Jefferson Narvaez, PT  Physical Therapist

## 2020-12-08 ENCOUNTER — TREATMENT (OUTPATIENT)
Dept: PHYSICAL THERAPY | Facility: CLINIC | Age: 20
End: 2020-12-08

## 2020-12-08 DIAGNOSIS — S46.912S STRAIN OF LEFT SHOULDER, SEQUELA: ICD-10-CM

## 2020-12-08 DIAGNOSIS — M54.2 CERVICALGIA: Primary | ICD-10-CM

## 2020-12-08 DIAGNOSIS — G89.29 CHRONIC LEFT SHOULDER PAIN: ICD-10-CM

## 2020-12-08 DIAGNOSIS — M25.512 CHRONIC LEFT SHOULDER PAIN: ICD-10-CM

## 2020-12-08 PROCEDURE — 97140 MANUAL THERAPY 1/> REGIONS: CPT | Performed by: PHYSICAL THERAPIST

## 2020-12-08 PROCEDURE — 97530 THERAPEUTIC ACTIVITIES: CPT | Performed by: PHYSICAL THERAPIST

## 2020-12-08 PROCEDURE — 97110 THERAPEUTIC EXERCISES: CPT | Performed by: PHYSICAL THERAPIST

## 2020-12-08 NOTE — PROGRESS NOTES
Physical Therapy Daily Progress Note    Patient Information  Raquel Guzman  2000      Visit # : 5    Raquel Guzman reports 3/10 pain today at rest.  Pain in the neck and UT area on the L.        Objective Pt presents to PT today with no distress noted.     Pt with fatigue less in the L UE.      Wall slide lower trap exercises seemed to tighten up the neck MM and create light headedness.     L scalene and UT area of tenderness is less with STM activity today.     See Exercise, Manual, and Modality Logs for complete treatment.     Assessment/Plan  Pt with improved overall status. The L Neck MM are less tense and she is improving on the pain as well.       Progress per Plan of Care and Progress strengthening /stabilization /functional activity      Visit Diagnoses:    ICD-10-CM ICD-9-CM   1. Cervicalgia  M54.2 723.1   2. Chronic left shoulder pain  M25.512 719.41    G89.29 338.29   3. Strain of left shoulder, sequela  S46.912S 905.7            Manual Therapy:    13     mins  04494;  Therapeutic Exercise:    33     mins  80539;     Neuromuscular Ruy:        mins  69321;    Therapeutic Activity:     9     mins  81771;     Gait Training:           mins  63295;     Ultrasound:          mins  74137;    Electrical Stimulation:         mins  90376 ( );  Dry Needling          mins self-pay    Timed Treatment:   55   mins   Total Treatment:     55   mins          Jefferson Narvaez, PT  Physical Therapist

## 2020-12-17 ENCOUNTER — TREATMENT (OUTPATIENT)
Dept: PHYSICAL THERAPY | Facility: CLINIC | Age: 20
End: 2020-12-17

## 2020-12-17 DIAGNOSIS — S46.912S STRAIN OF LEFT SHOULDER, SEQUELA: ICD-10-CM

## 2020-12-17 DIAGNOSIS — M54.2 CERVICALGIA: Primary | ICD-10-CM

## 2020-12-17 DIAGNOSIS — M25.512 CHRONIC LEFT SHOULDER PAIN: ICD-10-CM

## 2020-12-17 DIAGNOSIS — G89.29 CHRONIC LEFT SHOULDER PAIN: ICD-10-CM

## 2020-12-17 PROCEDURE — 97530 THERAPEUTIC ACTIVITIES: CPT | Performed by: PHYSICAL THERAPIST

## 2020-12-17 PROCEDURE — 97110 THERAPEUTIC EXERCISES: CPT | Performed by: PHYSICAL THERAPIST

## 2020-12-17 PROCEDURE — 97140 MANUAL THERAPY 1/> REGIONS: CPT | Performed by: PHYSICAL THERAPIST

## 2020-12-17 NOTE — PROGRESS NOTES
Physical Therapy Daily Progress Note    Patient Information  Raquel Guzman  2000      Visit # : 6    Raquel Guzman reports 6/10 pain today at rest.  Pt reports that she went the chiro and she has felt worse since then.         Objective Pt presents to PT today with moderate guarding and distress.     Joint mobility seems to be good, maybe excessive in the C/S.    MM spasms and guarding noted with activity today.      STM / massage seemed to reduce the pain and guarding.     See Exercise, Manual, and Modality Logs for complete treatment.     Assessment/Plan  Pt with elevated pain from a very aggressive chiropractic treatment.  I encouraged her to avoid any further chiropractic care at this time.  She is very flared up from the treatment and she already seems slightly hypermobile.     She would benefit from anti-inflammatory meds.      Progress per Plan of Care and Progress strengthening /stabilization /functional activity  Re-assess next visit.     Visit Diagnoses:    ICD-10-CM ICD-9-CM   1. Cervicalgia  M54.2 723.1   2. Chronic left shoulder pain  M25.512 719.41    G89.29 338.29   3. Strain of left shoulder, sequela  S46.912S 905.7            Manual Therapy:    13     mins  30188;  Therapeutic Exercise:     14    mins  92521;     Neuromuscular Ruy:        mins  22545;    Therapeutic Activity:     12     mins  49291;     Gait Training:           mins  11548;     Ultrasound:          mins  84610;    Electrical Stimulation:         mins  65289 ( );  Dry Needling          mins self-pay    Timed Treatment:   39   mins   Total Treatment:     39   mins          Jefferson Narvaez, PT  Physical Therapist

## 2020-12-22 ENCOUNTER — TREATMENT (OUTPATIENT)
Dept: PHYSICAL THERAPY | Facility: CLINIC | Age: 20
End: 2020-12-22

## 2020-12-22 DIAGNOSIS — G89.29 CHRONIC LEFT SHOULDER PAIN: ICD-10-CM

## 2020-12-22 DIAGNOSIS — S46.912S STRAIN OF LEFT SHOULDER, SEQUELA: ICD-10-CM

## 2020-12-22 DIAGNOSIS — M25.512 CHRONIC LEFT SHOULDER PAIN: ICD-10-CM

## 2020-12-22 DIAGNOSIS — M54.2 CERVICALGIA: Primary | ICD-10-CM

## 2020-12-22 PROCEDURE — 97110 THERAPEUTIC EXERCISES: CPT | Performed by: PHYSICAL THERAPIST

## 2020-12-22 PROCEDURE — 97530 THERAPEUTIC ACTIVITIES: CPT | Performed by: PHYSICAL THERAPIST

## 2020-12-22 NOTE — PROGRESS NOTES
Physical Therapy Daily Progress Note    Patient Information  Raquel Guzman  2000      Visit # : 7    Raquel Guzman reports 4-5/10 pain today at rest.  Pt with more pain in the neck and Head than the shoulder blade.     Pt has had elevated pain since going to the chiropractor.    Objective Pt presents to PT today with moderate distress noted.  Pt is guarded and painful with neck and shoulder.    Palpation:  Myofascial release of the levator scapula MM and UT -  + for complete relief of pain in the Head and shoulders.     See Exercise, Manual, and Modality Logs for complete treatment.     Assessment/Plan  Pt with myofascial release completely relieving pain.  Pt with elevated inflammation and muscle guarding from the chiropractic treatment last week.       Progress per Plan of Care and Progress strengthening /stabilization /functional activity      Visit Diagnoses:    ICD-10-CM ICD-9-CM   1. Cervicalgia  M54.2 723.1   2. Chronic left shoulder pain  M25.512 719.41    G89.29 338.29   3. Strain of left shoulder, sequela  S46.912S 905.7            Manual Therapy:    17     mins  31314;  Therapeutic Exercise:         mins  78716;     Neuromuscular Ruy:        mins  89493;    Therapeutic Activity:     25     mins  80610;     Gait Training:           mins  47152;     Ultrasound:          mins  70418;    Electrical Stimulation:         mins  61683 ( );  Dry Needling          mins self-pay    Timed Treatment:   42   mins   Total Treatment:     42   mins          Jefferson Narvaez, PT  Physical Therapist

## 2020-12-29 ENCOUNTER — TREATMENT (OUTPATIENT)
Dept: PHYSICAL THERAPY | Facility: CLINIC | Age: 20
End: 2020-12-29

## 2020-12-29 DIAGNOSIS — M54.2 CERVICALGIA: Primary | ICD-10-CM

## 2020-12-29 DIAGNOSIS — M25.512 CHRONIC LEFT SHOULDER PAIN: ICD-10-CM

## 2020-12-29 DIAGNOSIS — S46.912S STRAIN OF LEFT SHOULDER, SEQUELA: ICD-10-CM

## 2020-12-29 DIAGNOSIS — G89.29 CHRONIC LEFT SHOULDER PAIN: ICD-10-CM

## 2020-12-29 PROCEDURE — 97530 THERAPEUTIC ACTIVITIES: CPT | Performed by: PHYSICAL THERAPIST

## 2020-12-29 PROCEDURE — 97110 THERAPEUTIC EXERCISES: CPT | Performed by: PHYSICAL THERAPIST

## 2020-12-29 PROCEDURE — 97140 MANUAL THERAPY 1/> REGIONS: CPT | Performed by: PHYSICAL THERAPIST

## 2020-12-29 NOTE — PROGRESS NOTES
Physical Therapy Daily Progress Note    Patient Information  Raquel Guzman  2000      Visit # : 8    Raquel Guzman reports 2/10 pain today at rest.  L UT area.  No HA today.  It's been 4 days since last HA.  Pt reports she was able to work yesterday with only a little stiffness.         Objective Pt presents to PT today with no distress noted.     L UT / Levator seems to be the primary area today.      L UE still fatigues quickly.     Myofascial release techniques are + for relief of pain.    Post PT 0/10 pain.     See Exercise, Manual, and Modality Logs for complete treatment.     Assessment/Plan  Pt with improved neck and HA.  Her sxs today are back to the L scapular MM and the fatigue noted.       Progress per Plan of Care and Progress strengthening /stabilization /functional activity      Visit Diagnoses:    ICD-10-CM ICD-9-CM   1. Cervicalgia  M54.2 723.1   2. Chronic left shoulder pain  M25.512 719.41    G89.29 338.29   3. Strain of left shoulder, sequela  S46.912S 905.7            Manual Therapy:    13     mins  08420;  Therapeutic Exercise:    27     mins  32611;     Neuromuscular Ruy:        mins  79302;    Therapeutic Activity:     13     mins  07663;     Gait Training:           mins  76506;     Ultrasound:          mins  89455;    Electrical Stimulation:         mins  27975 ( );  Dry Needling          mins self-pay    Timed Treatment:   53   mins   Total Treatment:     53   mins          Jefferson Narvaez, PT  Physical Therapist

## 2021-01-05 ENCOUNTER — TREATMENT (OUTPATIENT)
Dept: PHYSICAL THERAPY | Facility: CLINIC | Age: 21
End: 2021-01-05

## 2021-01-05 DIAGNOSIS — S46.912S STRAIN OF LEFT SHOULDER, SEQUELA: ICD-10-CM

## 2021-01-05 DIAGNOSIS — G89.29 CHRONIC LEFT SHOULDER PAIN: ICD-10-CM

## 2021-01-05 DIAGNOSIS — M54.2 CERVICALGIA: Primary | ICD-10-CM

## 2021-01-05 DIAGNOSIS — M25.512 CHRONIC LEFT SHOULDER PAIN: ICD-10-CM

## 2021-01-05 PROCEDURE — 97140 MANUAL THERAPY 1/> REGIONS: CPT | Performed by: PHYSICAL THERAPIST

## 2021-01-05 PROCEDURE — 97110 THERAPEUTIC EXERCISES: CPT | Performed by: PHYSICAL THERAPIST

## 2021-01-05 PROCEDURE — 97035 APP MDLTY 1+ULTRASOUND EA 15: CPT | Performed by: PHYSICAL THERAPIST

## 2021-01-05 PROCEDURE — 97530 THERAPEUTIC ACTIVITIES: CPT | Performed by: PHYSICAL THERAPIST

## 2021-01-05 NOTE — PROGRESS NOTES
Physical Therapy Daily Progress Note    Patient Information  Raquel Guzman  2000      Visit # : 9    Raquel Guzman reports 5/10 pain today at rest.  Pt reports she fell at work on her L shoulder Sunday and it flared everything back up.  Pt reports she is having more pain and pressure in the L shoulder.         Objective Pt presents to PT today with minimal guarding in the L UE and neck area.     Pt with elevated pain in the L UT / levator / scalene.  Pt with MM guarding noted prior to PT.    Active exercises seem to elevate the MM guarding and she is fatiguing  More today.      See Exercise, Manual, and Modality Logs for complete treatment.     Assessment/Plan  Pt with elevated pain today due to another fall onto her L shoulder area.  Pt with improvement overall prior to this fall.  She is slightly inflamed from the fall.       Progress per Plan of Care and Progress strengthening /stabilization /functional activity      Visit Diagnoses:    ICD-10-CM ICD-9-CM   1. Cervicalgia  M54.2 723.1   2. Chronic left shoulder pain  M25.512 719.41    G89.29 338.29   3. Strain of left shoulder, sequela  S46.912S 905.7            Manual Therapy:    13     mins  22115;  Therapeutic Exercise:    17     mins  81697;     Neuromuscular Ruy:        mins  73382;    Therapeutic Activity:     13     mins  68327;     Gait Training:           mins  05468;     Ultrasound:     10     mins  37674;    Electrical Stimulation:         mins  58455 ( );  Dry Needling          mins self-pay    Timed Treatment:   53   mins   Total Treatment:     53   mins          Jefferson Narvaez, PT  Physical Therapist

## 2021-01-12 ENCOUNTER — TREATMENT (OUTPATIENT)
Dept: PHYSICAL THERAPY | Facility: CLINIC | Age: 21
End: 2021-01-12

## 2021-01-12 DIAGNOSIS — S46.912S STRAIN OF LEFT SHOULDER, SEQUELA: ICD-10-CM

## 2021-01-12 DIAGNOSIS — M54.2 CERVICALGIA: Primary | ICD-10-CM

## 2021-01-12 DIAGNOSIS — M25.512 CHRONIC LEFT SHOULDER PAIN: ICD-10-CM

## 2021-01-12 DIAGNOSIS — G89.29 CHRONIC LEFT SHOULDER PAIN: ICD-10-CM

## 2021-01-12 PROCEDURE — 97530 THERAPEUTIC ACTIVITIES: CPT | Performed by: PHYSICAL THERAPIST

## 2021-01-12 PROCEDURE — 97110 THERAPEUTIC EXERCISES: CPT | Performed by: PHYSICAL THERAPIST

## 2021-01-12 PROCEDURE — 97140 MANUAL THERAPY 1/> REGIONS: CPT | Performed by: PHYSICAL THERAPIST

## 2021-01-12 PROCEDURE — 97035 APP MDLTY 1+ULTRASOUND EA 15: CPT | Performed by: PHYSICAL THERAPIST

## 2021-01-12 NOTE — PROGRESS NOTES
Physical Therapy Daily Progress Note    Patient Information  Raquel Guzman  2000      Visit # : 10    Raquel Guzman reports 3/10 pain today at rest.  The L anterior shoulder is the primary area of pain.         Objective Pt presents to PT today with no distress noted at rest.     Palpation:  Primary area of pain noted today is the L levator scapula.  L pec minor is still slightly guarded as well.     Pt with improved function and less fatigue noted today.      Post PT less pain noted.     HA occurring from the sub occipital MM when she is fatigues and guarded.     See Exercise, Manual, and Modality Logs for complete treatment.     Assessment/Plan  Pt with improved function and less pain noted.  Pt with sxs still improving but she continues to have small injuries that limit her activity and increase her pain.     Progress per Plan of Care and Progress strengthening /stabilization /functional activity      Visit Diagnoses:    ICD-10-CM ICD-9-CM   1. Cervicalgia  M54.2 723.1   2. Chronic left shoulder pain  M25.512 719.41    G89.29 338.29   3. Strain of left shoulder, sequela  S46.912S 905.7            Manual Therapy:    13     mins  65925;  Therapeutic Exercise:    19     mins  94935;     Neuromuscular Ruy:        mins  51603;    Therapeutic Activity:     11     mins  48606;     Gait Training:           mins  04592;     Ultrasound:     10     mins  44741;    Electrical Stimulation:         mins  79850 ( );  Dry Needling          mins self-pay    Timed Treatment:   53   mins   Total Treatment:     53   mins          Jefferson Narvaez, PT  Physical Therapist

## 2021-01-19 ENCOUNTER — TREATMENT (OUTPATIENT)
Dept: PHYSICAL THERAPY | Facility: CLINIC | Age: 21
End: 2021-01-19

## 2021-01-19 DIAGNOSIS — G89.29 CHRONIC LEFT SHOULDER PAIN: ICD-10-CM

## 2021-01-19 DIAGNOSIS — S46.912S STRAIN OF LEFT SHOULDER, SEQUELA: ICD-10-CM

## 2021-01-19 DIAGNOSIS — M54.2 CERVICALGIA: Primary | ICD-10-CM

## 2021-01-19 DIAGNOSIS — M25.512 CHRONIC LEFT SHOULDER PAIN: ICD-10-CM

## 2021-01-19 PROCEDURE — 97530 THERAPEUTIC ACTIVITIES: CPT | Performed by: PHYSICAL THERAPIST

## 2021-01-19 PROCEDURE — 97110 THERAPEUTIC EXERCISES: CPT | Performed by: PHYSICAL THERAPIST

## 2021-01-19 PROCEDURE — 97140 MANUAL THERAPY 1/> REGIONS: CPT | Performed by: PHYSICAL THERAPIST

## 2021-01-19 NOTE — PROGRESS NOTES
Physical Therapy Daily Progress Note    Patient Information  Raquel Guzman  2000      Visit # : 11    Raquel Guzman reports 2/10 pain today at rest.  Pt reports a mm spasm in the L greater than the R in the shoulder blade and neck.          Objective Pt presents to PT today with no distress at rest.  SHe is guarded and slightly protective with motion in the UE on the L UE.     L periscapular MM tightness and guarding.     STM with tool seemed to improve with ability to get to the L levator MM.  There was a lot of popping with that area.     See Exercise, Manual, and Modality Logs for complete treatment.     Assessment/Plan  Pt with improvement slow.  She would benefit from more HEP at home on her own.  The STM with tool was new today.       Progress per Plan of Care and Progress strengthening /stabilization /functional activity      Visit Diagnoses:    ICD-10-CM ICD-9-CM   1. Cervicalgia  M54.2 723.1   2. Chronic left shoulder pain  M25.512 719.41    G89.29 338.29   3. Strain of left shoulder, sequela  S46.912S 905.7            Manual Therapy:    15     mins  05004;  Therapeutic Exercise:     13    mins  69239;     Neuromuscular Ruy:        mins  67986;    Therapeutic Activity:     11     mins  04641;     Gait Training:           mins  14337;     Ultrasound:          mins  80330;    Electrical Stimulation:         mins  36707 ( );  Dry Needling          mins self-pay    Timed Treatment:   39   mins   Total Treatment:     39   mins          Jefferson Narvaez, PT  Physical Therapist

## 2021-02-02 ENCOUNTER — TREATMENT (OUTPATIENT)
Dept: PHYSICAL THERAPY | Facility: CLINIC | Age: 21
End: 2021-02-02

## 2021-02-02 DIAGNOSIS — G54.0 BRACHIAL PLEXOPATHY: ICD-10-CM

## 2021-02-02 DIAGNOSIS — S46.912S STRAIN OF LEFT SHOULDER, SEQUELA: Primary | ICD-10-CM

## 2021-02-02 DIAGNOSIS — S16.1XXS STRAIN OF NECK MUSCLE, SEQUELA: ICD-10-CM

## 2021-02-02 PROCEDURE — 97530 THERAPEUTIC ACTIVITIES: CPT | Performed by: PHYSICAL THERAPIST

## 2021-02-02 PROCEDURE — 97110 THERAPEUTIC EXERCISES: CPT | Performed by: PHYSICAL THERAPIST

## 2021-02-02 NOTE — PROGRESS NOTES
Physical Therapy Daily Progress Note    Patient Information  Raquel Guzman  2000      Visit # : 12    Raquel Guzman reports 3/10 pain today at rest.  Pt reports she is slowly improving.  She is still having pain and difficulty sleeping.     Pain today is in the shoulder blade and the neck is stiff.   The L anterior shoulder is slightly painful.  No numbness or tingling in the shoulder and arm.        Objective          Neurological Testing     Sensation     Shoulder   Left Shoulder   Intact: light touch    Right Shoulder   Intact: light touch    Reflexes   Left   Biceps (C5/C6): normal (2+)  Brachioradialis (C6): normal (2+)  Triceps (C7): normal (2+)    Right   Biceps (C5/C6): normal (2+)  Brachioradialis (C6): normal (2+)  Triceps (C7): normal (2+)    Active Range of Motion   Cervical/Thoracic Spine   Cervical    Right lateral flexion: Neck active lateral bend right: minimal tightness noted on the L C/S.    Left rotation: WFL  Right rotation: Neck active rotation right: tightness noted in the left C/S area.  with pain  Left Shoulder   Flexion: 180 (only minimal ERP in the top of the shoulder) degrees   External rotation 90°: 95 degrees   Internal rotation 90°: 62 degrees     Strength/Myotome Testing     Left Wrist/Hand      (2nd hand position)     Trial 1: 58 lbs    Trial 2: 54 lbs    Trial 3: 44 lbs    Average: 52 lbs    Right Wrist/Hand      (2nd hand position)     Trial 1: 70 lbs    Trial 2: 60 lbs    Trial 3: 55 lbs    Average: 61.67 lbs    Pt presents to PT today with no distress noted.  Pt did need correction of posture and verbal cues.       See Exercise, Manual, and Modality Logs for complete treatment.     Assessment/Plan  Pt with improved function and less pain noted.  She is better noted with her functional outcome and her ROM and strength measurements.  She needs to do more HEP at home and more frequent exercises as well.     Progress per Plan of Care and Progress strengthening  /stabilization /functional activity      Visit Diagnoses:    ICD-10-CM ICD-9-CM   1. Strain of left shoulder, sequela  S46.912S 905.7   2. Strain of neck muscle, sequela  S16.1XXS 905.7   3. Brachial plexopathy  G54.0 353.0            Manual Therapy:         mins  48462;  Therapeutic Exercise:    41     mins  86500;     Neuromuscular Ruy:        mins  34192;    Therapeutic Activity:     15     mins  91445;     Gait Training:           mins  85229;     Ultrasound:          mins  42033;    Electrical Stimulation:         mins  81657 ( );  Dry Needling          mins self-pay    Timed Treatment:   56   mins   Total Treatment:     56   mins          Jefferson Narvaez, PT  Physical Therapist

## 2021-02-09 ENCOUNTER — TREATMENT (OUTPATIENT)
Dept: PHYSICAL THERAPY | Facility: CLINIC | Age: 21
End: 2021-02-09

## 2021-02-09 DIAGNOSIS — G54.0 BRACHIAL PLEXOPATHY: ICD-10-CM

## 2021-02-09 DIAGNOSIS — S46.912S STRAIN OF LEFT SHOULDER, SEQUELA: Primary | ICD-10-CM

## 2021-02-09 DIAGNOSIS — S16.1XXS STRAIN OF NECK MUSCLE, SEQUELA: ICD-10-CM

## 2021-02-09 DIAGNOSIS — M54.2 CERVICALGIA: ICD-10-CM

## 2021-02-09 PROCEDURE — 97530 THERAPEUTIC ACTIVITIES: CPT | Performed by: PHYSICAL THERAPIST

## 2021-02-09 PROCEDURE — 97110 THERAPEUTIC EXERCISES: CPT | Performed by: PHYSICAL THERAPIST

## 2021-02-09 PROCEDURE — 97140 MANUAL THERAPY 1/> REGIONS: CPT | Performed by: PHYSICAL THERAPIST

## 2021-02-09 NOTE — PROGRESS NOTES
Physical Therapy Daily Progress Note    Patient Information  Raquel Guzman  2000      Visit # : 13    Raquel Guzman reports 2/10 pain today at rest.  Pt reports she was doing well with 4 days off from work and it was pain free.  When she went back to work and the pain returned.    Pain is in the anterior lateral neck and chest on the L.        Objective Pt presents to PT today with fatigue with activity.      Pt with pain noted in the L shoulder with resisted shoulder ER with pain noted in the anterior shoulder as the primary area.     C/S PROM is WNL's with in multiple directions without any elevated pain noted.     See Exercise, Manual, and Modality Logs for complete treatment.     Assessment/Plan  Pt with L shoulder involvement may be more than previously thought.  She may have some shoulder labral involvement.       Progress per Plan of Care and Progress strengthening /stabilization /functional activity  Check shoulder and labrum next visit again.     Visit Diagnoses:    ICD-10-CM ICD-9-CM   1. Strain of left shoulder, sequela  S46.912S 905.7   2. Strain of neck muscle, sequela  S16.1XXS 905.7   3. Brachial plexopathy  G54.0 353.0   4. Cervicalgia  M54.2 723.1            Manual Therapy:    15     mins  94982;  Therapeutic Exercise:    18     mins  04888;     Neuromuscular Ruy:        mins  79496;    Therapeutic Activity:     10     mins  33017;     Gait Training:           mins  53235;     Ultrasound:          mins  57762;    Electrical Stimulation:         mins  69487 ( );  Dry Needling          mins self-pay    Timed Treatment:   43   mins   Total Treatment:     43   mins          Jeffreson Narvaez, PT  Physical Therapist

## 2021-02-23 ENCOUNTER — TREATMENT (OUTPATIENT)
Dept: PHYSICAL THERAPY | Facility: CLINIC | Age: 21
End: 2021-02-23

## 2021-02-23 DIAGNOSIS — G54.0 BRACHIAL PLEXOPATHY: ICD-10-CM

## 2021-02-23 DIAGNOSIS — S16.1XXS STRAIN OF NECK MUSCLE, SEQUELA: ICD-10-CM

## 2021-02-23 DIAGNOSIS — S46.912S STRAIN OF LEFT SHOULDER, SEQUELA: Primary | ICD-10-CM

## 2021-02-23 PROCEDURE — 97110 THERAPEUTIC EXERCISES: CPT | Performed by: PHYSICAL THERAPIST

## 2021-02-23 PROCEDURE — 97530 THERAPEUTIC ACTIVITIES: CPT | Performed by: PHYSICAL THERAPIST

## 2021-02-23 NOTE — PROGRESS NOTES
Physical Therapy Daily Progress Note    Patient Information  Raquel Guzman  2000      Visit # : 14    Raquel Guzman reports 2/10 pain today at rest.  Pt reports she is feeling a little better in the shoulder and neck.  She is only having issues when she lays on the L side.      She has been off of work for a week and that may have helped.     Objective Pt presents to PT today with improved posture in seated position.      Pt with L scapular motion still needed cues for engage properly.    L shoulder still has some popping noted with certain motions but it is less today.     See Exercise, Manual, and Modality Logs for complete treatment.     Assessment/Plan  Pt is having much less pain but she has been working less.  She still may have a labral tear of the shoulder that could be causing some of this.       Progress per Plan of Care and Progress strengthening /stabilization /functional activity      Visit Diagnoses:    ICD-10-CM ICD-9-CM   1. Strain of left shoulder, sequela  S46.912S 905.7   2. Strain of neck muscle, sequela  S16.1XXS 905.7   3. Brachial plexopathy  G54.0 353.0            Manual Therapy:         mins  17358;  Therapeutic Exercise:    26     mins  30454;     Neuromuscular Ruy:        mins  50234;    Therapeutic Activity:     16     mins  29367;     Gait Training:           mins  84309;     Ultrasound:          mins  90452;    Electrical Stimulation:         mins  34743 ( );  Dry Needling          mins self-pay    Timed Treatment:   42   mins   Total Treatment:     50   mins          Jefferson Narvaez, PT  Physical Therapist

## 2021-03-01 ENCOUNTER — TRANSCRIBE ORDERS (OUTPATIENT)
Dept: ADMINISTRATIVE | Facility: HOSPITAL | Age: 21
End: 2021-03-01

## 2021-03-01 DIAGNOSIS — R51.9 ACUTE NONINTRACTABLE HEADACHE, UNSPECIFIED HEADACHE TYPE: Primary | ICD-10-CM

## 2021-03-02 ENCOUNTER — TREATMENT (OUTPATIENT)
Dept: PHYSICAL THERAPY | Facility: CLINIC | Age: 21
End: 2021-03-02

## 2021-03-02 DIAGNOSIS — S16.1XXS STRAIN OF NECK MUSCLE, SEQUELA: ICD-10-CM

## 2021-03-02 DIAGNOSIS — S46.912S STRAIN OF LEFT SHOULDER, SEQUELA: Primary | ICD-10-CM

## 2021-03-02 PROCEDURE — 97110 THERAPEUTIC EXERCISES: CPT | Performed by: PHYSICAL THERAPIST

## 2021-03-02 PROCEDURE — 97530 THERAPEUTIC ACTIVITIES: CPT | Performed by: PHYSICAL THERAPIST

## 2021-03-02 NOTE — PROGRESS NOTES
Physical Therapy Daily Progress Note    Patient Information  Raquel Guzman  2000      Visit # : 15    Raquel Guzman reports 4/10 pain today at rest.  Pt reports she was doing well until she hit her head.  Pt with     Pain is located in the anterior shoulder area.      Objective Pt presents to PT today with no distress noted at rest.      Pt with resisted ER of the L shoulder still causing pain.      See Exercise, Manual, and Modality Logs for complete treatment.     Assessment/Plan  Pt with improved overall endurance in the shoulder and arm.  She may have some bicep/SLAP injury underlying.       Progress per Plan of Care  Continue with PT 1 x per week for 3 weeks then D/C to HEP.     Visit Diagnoses:    ICD-10-CM ICD-9-CM   1. Strain of left shoulder, sequela  S46.912S 905.7   2. Strain of neck muscle, sequela  S16.1XXS 905.7            Manual Therapy:         mins  74396;  Therapeutic Exercise:    42     mins  48484;     Neuromuscular Ruy:        mins  70935;    Therapeutic Activity:     11     mins  03227;     Gait Training:           mins  54978;     Ultrasound:          mins  99715;    Electrical Stimulation:         mins  89452 ( );  Dry Needling          mins self-pay    Timed Treatment:   53   mins   Total Treatment:     53   mins          Jefferson Narvaez, PT  Physical Therapist

## 2021-03-09 ENCOUNTER — TREATMENT (OUTPATIENT)
Dept: PHYSICAL THERAPY | Facility: CLINIC | Age: 21
End: 2021-03-09

## 2021-03-09 ENCOUNTER — HOSPITAL ENCOUNTER (OUTPATIENT)
Dept: CT IMAGING | Facility: HOSPITAL | Age: 21
Discharge: HOME | End: 2021-03-09
Admitting: INTERNAL MEDICINE

## 2021-03-09 DIAGNOSIS — S16.1XXS STRAIN OF NECK MUSCLE, SEQUELA: ICD-10-CM

## 2021-03-09 DIAGNOSIS — S46.912S STRAIN OF LEFT SHOULDER, SEQUELA: Primary | ICD-10-CM

## 2021-03-09 DIAGNOSIS — G54.0 BRACHIAL PLEXOPATHY: ICD-10-CM

## 2021-03-09 DIAGNOSIS — R51.9 ACUTE NONINTRACTABLE HEADACHE, UNSPECIFIED HEADACHE TYPE: ICD-10-CM

## 2021-03-09 PROCEDURE — 97110 THERAPEUTIC EXERCISES: CPT | Performed by: PHYSICAL THERAPIST

## 2021-03-09 PROCEDURE — 97530 THERAPEUTIC ACTIVITIES: CPT | Performed by: PHYSICAL THERAPIST

## 2021-03-09 PROCEDURE — 70450 CT HEAD/BRAIN W/O DYE: CPT

## 2021-03-09 NOTE — PROGRESS NOTES
Physical Therapy Daily Progress Note    Patient Information  Raquel Guzman  2000      Visit # : 16    Raquel Guzman reports 3-4/10 pain today at rest.  Pt with R UT area of pain and shoulder is popping a lot more.         Objective Pt presents to PT today with no distress noted.  Posture is still slightly slouched and protracted but better than before.     L shoulder occasional catching and popping noted with exercises and ROM activity.     MMT:  ER L 4+/5,  IR 4+/5       See Exercise, Manual, and Modality Logs for complete treatment.     Assessment/Plan  Improved Strength in the ER and IR of the shoulder but the popping is still present.  I encouraged her to make an appointment with MD to check out possible Labral involvement.       Progress per Plan of Care and Progress strengthening /stabilization /functional activity      Visit Diagnoses:    ICD-10-CM ICD-9-CM   1. Strain of left shoulder, sequela  S46.912S 905.7   2. Strain of neck muscle, sequela  S16.1XXS 905.7   3. Brachial plexopathy  G54.0 353.0            Manual Therapy:         mins  97974;  Therapeutic Exercise:    28     mins  05518;     Neuromuscular Ruy:        mins  26360;    Therapeutic Activity:     11     mins  06091;     Gait Training:           mins  72439;     Ultrasound:          mins  99472;    Electrical Stimulation:         mins  36481 ( );  Dry Needling          mins self-pay    Timed Treatment:   39   mins   Total Treatment:     39   mins          Jefferson Narvaez, PT  Physical Therapist

## 2021-03-16 ENCOUNTER — TREATMENT (OUTPATIENT)
Dept: PHYSICAL THERAPY | Facility: CLINIC | Age: 21
End: 2021-03-16

## 2021-03-16 DIAGNOSIS — S16.1XXS STRAIN OF NECK MUSCLE, SEQUELA: ICD-10-CM

## 2021-03-16 DIAGNOSIS — S46.912S STRAIN OF LEFT SHOULDER, SEQUELA: Primary | ICD-10-CM

## 2021-03-16 DIAGNOSIS — G54.0 BRACHIAL PLEXOPATHY: ICD-10-CM

## 2021-03-16 PROCEDURE — 97530 THERAPEUTIC ACTIVITIES: CPT | Performed by: PHYSICAL THERAPIST

## 2021-03-16 PROCEDURE — 97110 THERAPEUTIC EXERCISES: CPT | Performed by: PHYSICAL THERAPIST

## 2021-03-16 NOTE — PROGRESS NOTES
Physical Therapy Daily Progress Note    Patient Information  Raquel Guzman  2000      Visit # : 17    Raquel Guzman reports 3-4/10 pain today at rest.  L anterior and superior shoulder is the primary area of pain.     Pt with stiffness every morning.  Stiffness is noted in the L scapular region.     Pt reports the elevated exercises last visit seemed to help her feel better that day but she was sore the next day.     Objective Pt presents to PT today with no distress at rest.     Pt is having more issues with the shoulder joint and popping in the L shoulder.       See Exercise, Manual, and Modality Logs for complete treatment.     Assessment/Plan  Pt with L shoudler labral involvement highly suspected.       Progress per Plan of Care and Progress strengthening /stabilization /functional activity  D/C next visit until MD visit.     Visit Diagnoses:    ICD-10-CM ICD-9-CM   1. Strain of left shoulder, sequela  S46.912S 905.7   2. Strain of neck muscle, sequela  S16.1XXS 905.7   3. Brachial plexopathy  G54.0 353.0            Manual Therapy:         mins  14000;  Therapeutic Exercise:    41     mins  92400;     Neuromuscular Ruy:        mins  22449;    Therapeutic Activity:     12     mins  15774;     Gait Training:           mins  86704;     Ultrasound:          mins  03835;    Electrical Stimulation:         mins  89817 ( );  Dry Needling          mins self-pay    Timed Treatment:   53   mins   Total Treatment:     63   mins          Jefferson Narvaez, PT  Physical Therapist

## 2021-03-23 ENCOUNTER — TREATMENT (OUTPATIENT)
Dept: PHYSICAL THERAPY | Facility: CLINIC | Age: 21
End: 2021-03-23

## 2021-03-23 DIAGNOSIS — S16.1XXS STRAIN OF NECK MUSCLE, SEQUELA: ICD-10-CM

## 2021-03-23 DIAGNOSIS — G54.0 BRACHIAL PLEXOPATHY: ICD-10-CM

## 2021-03-23 DIAGNOSIS — S46.912S STRAIN OF LEFT SHOULDER, SEQUELA: Primary | ICD-10-CM

## 2021-03-23 PROCEDURE — 97110 THERAPEUTIC EXERCISES: CPT | Performed by: PHYSICAL THERAPIST

## 2021-03-23 PROCEDURE — 97530 THERAPEUTIC ACTIVITIES: CPT | Performed by: PHYSICAL THERAPIST

## 2021-03-23 NOTE — PROGRESS NOTES
Physical Therapy Daily Progress Note    Patient Information  Raquel Guzman  2000      Visit # : 18    Raquel Guzman reports 2/10 pain today at rest.  Pt with pain more localized pain noted in the L shoulder into the anterior shoulder in the bicep tendon area.     Prolonged L shoulder ER causes pain in the anterior shoulder and causes it to go numb.  Driving with only the L UE causes the L shoulder to hurt and pop.        Objective          Active Range of Motion   Cervical/Thoracic Spine   Cervical    Left lateral flexion: WFL  Right lateral flexion: WFL  Left rotation: WFL  Right rotation: WFL  Left Shoulder   Flexion: 156 (end range catching) degrees with pain  Abduction: 122 (pain at end range) degrees   External rotation 90°: 75 degrees   Internal rotation 90°: 65 degrees with pain    Additional Active Range of Motion Details  No elevated pain with ROM testing.      Tests     Left Shoulder   Positive Neer's, Speed's and Yergason's.     Pt presents to PT today with no distress at rest.       See Exercise, Manual, and Modality Logs for complete treatment.     Assessment/Plan  Pt is much better in the neck and the periscapular region.  She is still having isolated pain in the anterior shoulder.  I highly suspect bicep tendon injury and may a SLAP injury to the L shoulder.       Hold PT until MD assessment and Diagnostics.       Visit Diagnoses:    ICD-10-CM ICD-9-CM   1. Strain of left shoulder, sequela  S46.912S 905.7   2. Strain of neck muscle, sequela  S16.1XXS 905.7   3. Brachial plexopathy  G54.0 353.0            Manual Therapy:         mins  24904;  Therapeutic Exercise:    13     mins  20774;     Neuromuscular Ruy:        mins  41225;    Therapeutic Activity:     25     mins  56211;     Gait Training:           mins  93139;     Ultrasound:          mins  48352;    Electrical Stimulation:         mins  58367 ( );  Dry Needling          mins self-pay    Timed Treatment:   38   mins   Total  Treatment:     38   mins          Jefferson Narvaez, PT  Physical Therapist

## 2021-06-02 ENCOUNTER — TRANSCRIBE ORDERS (OUTPATIENT)
Dept: LAB | Facility: HOSPITAL | Age: 21
End: 2021-06-02

## 2021-06-02 ENCOUNTER — LAB (OUTPATIENT)
Dept: LAB | Facility: HOSPITAL | Age: 21
End: 2021-06-02

## 2021-06-02 DIAGNOSIS — Z11.59 SPECIAL SCREENING EXAMINATION FOR VIRAL DISEASE: Primary | ICD-10-CM

## 2021-06-02 DIAGNOSIS — Z11.59 SPECIAL SCREENING EXAMINATION FOR VIRAL DISEASE: ICD-10-CM

## 2021-06-02 PROCEDURE — U0004 COV-19 TEST NON-CDC HGH THRU: HCPCS

## 2021-06-02 PROCEDURE — C9803 HOPD COVID-19 SPEC COLLECT: HCPCS

## 2021-06-03 LAB — SARS-COV-2 RNA NOSE QL NAA+PROBE: NOT DETECTED

## 2021-07-09 ENCOUNTER — TREATMENT (OUTPATIENT)
Dept: PHYSICAL THERAPY | Facility: CLINIC | Age: 21
End: 2021-07-09

## 2021-07-09 ENCOUNTER — TRANSCRIBE ORDERS (OUTPATIENT)
Dept: PHYSICAL THERAPY | Facility: CLINIC | Age: 21
End: 2021-07-09

## 2021-07-09 DIAGNOSIS — Z98.890 STATUS POST LABRAL REPAIR OF SHOULDER: Primary | ICD-10-CM

## 2021-07-09 PROCEDURE — 97161 PT EVAL LOW COMPLEX 20 MIN: CPT | Performed by: PHYSICAL THERAPIST

## 2021-07-09 NOTE — PROGRESS NOTES
Physical Therapy Initial Evaluation and Plan of Care      Patient: Raquel Guzman   : 2000  Diagnosis/ICD-10 Code:  Status post labral repair of shoulder [Z98.890]  Referring practitioner: No ref. provider found    Subjective Evaluation    History of Present Illness  Mechanism of injury:  labral repair on the L shoulder.      Pt reports the shoulder is feeling better since surgery but the neck is feeling a little worse from wearing the sling.      2 more weeks in the sling.       Patient Occupation: [employer]  Current pain ratin  At worst pain ratin  Location: L shoulder and scapular region.    Relieving factors: ice  Aggravating factors: movement             Objective          Postural Observations  Seated posture: fair  Standing posture: fair  Correction of posture: makes symptoms better        Observations     Additional Shoulder Observation Details  L shoulder sling present with neck strap dependence, the waist strap is too loose.     Pt having pain with arm at rest at the side without sling present.     Palpation     Additional Palpation Details  Minimal elevated hypertonicity / guarding in the shoulder area - pressure seems to make the shoulder relax more.      Cervical/Thoracic Screen   Cervical range of motion within normal limits    Passive Range of Motion   Left Shoulder   Flexion: 100 degrees with pain  Abduction: 48 degrees with pain    Additional Passive Range of Motion Details  Pain and guarding with PROM.  Pain and guarding limited testing of the PROM.  A/AROM on the ball is less painful in the shoulder.         Scapular Mobility   Left Shoulder   Scapular mobility: fair    Additional Scapular Mobility Details  Minimal hypomobility of the scapula on the L UE.           Assessment & Plan     Assessment  Impairments: abnormal muscle firing, abnormal or restricted ROM, activity intolerance, impaired physical strength, lacks appropriate home exercise program and pain with  Patient was seen and examined, chart reviewed. Full consult dictated. function  Assessment details: Patient is a 21 year old female who comes to physical therapy with L shoulder injury. Signs and symptoms are consistent with s/p labral repair of the L shoulder.  She also may have some issues with the brachial plexus being stretched at the same time of the injury.  The patient currently has pain, decreased ROM, decreased strength, and inability to perform all essential functional activities. Pt will benefit from skilled PT services to address the above issues.     Prognosis: good  Prognosis details:   SHORT TERM GOALS:     4 weeks  1. Pt I w/ HEP  2. Pt to demonstrate PROM of the L shoulder to WFL to improve ability to perform ADL's  3. Pt to demonstrate ability to perform 30 minutes continuous activity in the clinic without increase in pain     LONG TERM GOALS:   8 weeks  1. Pt to demonstrate AROM of the left shoulder to WNL to allow ability to perform all necessary functional activities  2. Pt to demonstrate ability to lift arm OH with the left arm without increase in pain  3. Pt to report being able to work full duty without increase in pain in the shoulder  4. Pt to tolerate 60 minutes continuous activity in the clinic without increase in pain     Functional Limitations: carrying objects, lifting, pulling, pushing, reaching behind back, reaching overhead and unable to perform repetitive tasks  Plan  Therapy options: will be seen for skilled physical therapy services  Planned modality interventions: cryotherapy, electrical stimulation/Russian stimulation, thermotherapy (hydrocollator packs) and ultrasound  Planned therapy interventions: manual therapy, flexibility, functional ROM exercises, home exercise program, joint mobilization, body mechanics training, postural training, soft tissue mobilization, strengthening, stretching and therapeutic activities  Duration in visits: 20  Treatment plan discussed with: patient  Plan details: Pt to be seen 2 days per week for 3-6 weeks.          Manual Therapy:    nc     mins  64389;  Therapeutic Exercise:     nc    mins  71620;     Neuromuscular Ruy:        mins  46128;    Therapeutic Activity:          mins  13637;     Gait Training:           mins  45732;     Ultrasound:          mins  07141;    Electrical Stimulation:         mins  95742 ( );  Dry Needling          mins self-pay    Timed Treatment:   nc   mins   Total Treatment:     38   mins    PT SIGNATURE: Jefferson Narvaez, PT   DATE TREATMENT INITIATED: 7/9/2021    Initial Certification  Certification Period: 10/7/2021  I certify that the therapy services are furnished while this patient is under my care.  The services outlined above are required by this patient, and will be reviewed every 90 days.     PHYSICIAN:       DATE:     Please sign and return via fax to  .. Thank you, [facility].

## 2021-07-14 ENCOUNTER — TREATMENT (OUTPATIENT)
Dept: PHYSICAL THERAPY | Facility: CLINIC | Age: 21
End: 2021-07-14

## 2021-07-14 DIAGNOSIS — S46.912S STRAIN OF LEFT SHOULDER, SEQUELA: ICD-10-CM

## 2021-07-14 DIAGNOSIS — Z98.890 STATUS POST LABRAL REPAIR OF SHOULDER: Primary | ICD-10-CM

## 2021-07-14 DIAGNOSIS — S16.1XXS STRAIN OF NECK MUSCLE, SEQUELA: ICD-10-CM

## 2021-07-14 PROCEDURE — 97530 THERAPEUTIC ACTIVITIES: CPT | Performed by: PHYSICAL THERAPIST

## 2021-07-14 PROCEDURE — 97110 THERAPEUTIC EXERCISES: CPT | Performed by: PHYSICAL THERAPIST

## 2021-07-14 NOTE — PROGRESS NOTES
Physical Therapy Daily Progress Note    Patient Information  Raquel Guzman  2000      Visit # : 2    Raquel Guzman reports 6/10 pain today at rest.  Pt reports she is feeling a little better with the shoulder with the sling changes.  Pt with sleep still limited as well.      4/10 pain at rest at night.      Objective Pt presents to PT today with no distress at rest.  She is wearing the sling into the PT area.     Pt with improved resting positions.  Pt was able to achieve full elbow extension today post PT.      See Exercise, Manual, and Modality Logs for complete treatment.     Assessment/Plan  Pt with sling better but still painful.  Pt with improved resting position.  She is having less pain noted.       Progress per Plan of Care and Progress strengthening /stabilization /functional activity      Visit Diagnoses:    ICD-10-CM ICD-9-CM   1. Status post labral repair of shoulder  Z98.890 V45.89   2. Strain of left shoulder, sequela  S46.912S 905.7   3. Strain of neck muscle, sequela  S16.1XXS 905.7            Manual Therapy:         mins  59861;  Therapeutic Exercise:    26     mins  89442;     Neuromuscular Ruy:        mins  62590;    Therapeutic Activity:     13     mins  91203;     Gait Training:           mins  16481;     Ultrasound:          mins  33864;    Electrical Stimulation:         mins  52084 ( );  Dry Needling          mins self-pay    Timed Treatment:   39   mins   Total Treatment:     39   mins          Jefferson Narvaez, PT  Physical Therapist

## 2021-07-16 ENCOUNTER — TREATMENT (OUTPATIENT)
Dept: PHYSICAL THERAPY | Facility: CLINIC | Age: 21
End: 2021-07-16

## 2021-07-16 DIAGNOSIS — S16.1XXS STRAIN OF NECK MUSCLE, SEQUELA: ICD-10-CM

## 2021-07-16 DIAGNOSIS — Z98.890 STATUS POST LABRAL REPAIR OF SHOULDER: Primary | ICD-10-CM

## 2021-07-16 DIAGNOSIS — S46.912S STRAIN OF LEFT SHOULDER, SEQUELA: ICD-10-CM

## 2021-07-16 PROCEDURE — 97530 THERAPEUTIC ACTIVITIES: CPT | Performed by: PHYSICAL THERAPIST

## 2021-07-16 PROCEDURE — 97140 MANUAL THERAPY 1/> REGIONS: CPT | Performed by: PHYSICAL THERAPIST

## 2021-07-16 PROCEDURE — 97110 THERAPEUTIC EXERCISES: CPT | Performed by: PHYSICAL THERAPIST

## 2021-07-16 NOTE — PROGRESS NOTES
Physical Therapy Daily Progress Note    Patient Information  Raquel Guzman  2000      Visit # : 3    Raquel Guzman reports 2-3/10 pain today at rest.  Pt with no distress at rest.  She is still using the sling.          Objective Pt presents to PT today with much less pain noted with donning and doffing sling.     PROM:  L shoulder flexion 125 degrees,  abd 80 degrees.    See Exercise, Manual, and Modality Logs for complete treatment.     Assessment/Plan  Pt with improved ROM and less pain.  She is having less guarding noted as well.       Progress per Plan of Care and Progress strengthening /stabilization /functional activity      Visit Diagnoses:    ICD-10-CM ICD-9-CM   1. Status post labral repair of shoulder  Z98.890 V45.89   2. Strain of left shoulder, sequela  S46.912S 905.7   3. Strain of neck muscle, sequela  S16.1XXS 905.7            Manual Therapy:    11     mins  93824;  Therapeutic Exercise:    29     mins  65826;     Neuromuscular Ruy:        mins  96993;    Therapeutic Activity:     13     mins  80032;     Gait Training:           mins  91920;     Ultrasound:          mins  08971;    Electrical Stimulation:         mins  98122 ( );  Dry Needling          mins self-pay    Timed Treatment:   53   mins   Total Treatment:     53   mins          Jefferson Narvaez, PT  Physical Therapist

## 2021-07-23 ENCOUNTER — TREATMENT (OUTPATIENT)
Dept: PHYSICAL THERAPY | Facility: CLINIC | Age: 21
End: 2021-07-23

## 2021-07-23 PROCEDURE — 97110 THERAPEUTIC EXERCISES: CPT | Performed by: PHYSICAL THERAPIST

## 2021-07-23 PROCEDURE — 97140 MANUAL THERAPY 1/> REGIONS: CPT | Performed by: PHYSICAL THERAPIST

## 2021-07-23 PROCEDURE — 97112 NEUROMUSCULAR REEDUCATION: CPT | Performed by: PHYSICAL THERAPIST

## 2021-07-23 PROCEDURE — 97530 THERAPEUTIC ACTIVITIES: CPT | Performed by: PHYSICAL THERAPIST

## 2021-07-23 NOTE — PROGRESS NOTES
Physical Therapy Daily Progress Note    TOTAL TIME: 65 MINUTES    Raquel Guzman reports: no pain at rest; feels like ROM is progressing well; she states that a co-worker accidentally opened the armored car door into her anterior shoulder recently, it was sore that day, but she iced it and it got better       Objective   See Exercise, Manual, and Modality Logs for complete treatment.     THERAPEUTIC EXERCISES/ACTIVITIES ADDED TODAY: pulleys, red swiss ball A/AAROM on table, backwards shoulder rolls, scapular retractions; left UT stx, seated cane ER (0-20 deg) non-painful range, seated table slides flexion, scaption     Manual: left GH mobilizations/ PROM; cervical mobilizations, scapular mobilizations in right sidelying, trigger point release x 20 minutes     Ice x 10 minutes to left shoulder after therapy     Assessment/Plan  Patient is progressing well s/p left shoulder arthroscopy; continue progressing functional ROM per protocol; patient c/o intermittent arm/hand tingling with elevated positions- she can alleviate the pain by changing positions; continue to promote active scapular stabilization and postural awareness    Progress per Plan of Care           Manual Therapy:    20     mins  03689;  Therapeutic Exercise:    15   mins  56541;     Neuromuscular Ruy:   10     mins  63595;    Therapeutic Activity:     10     mins  63693;     Gait Training:           mins  23991;     Ultrasound:          mins  44592;    Electrical Stimulation:         mins  98991 ( );  Dry Needling          mins self-pay    Timed Treatment:   55   mins   Total Treatment:     65   mins    ZACH Saravia PT  Physical Therapist

## 2021-07-26 ENCOUNTER — TREATMENT (OUTPATIENT)
Dept: PHYSICAL THERAPY | Facility: CLINIC | Age: 21
End: 2021-07-26

## 2021-07-26 DIAGNOSIS — S46.912S STRAIN OF LEFT SHOULDER, SEQUELA: ICD-10-CM

## 2021-07-26 DIAGNOSIS — Z98.890 STATUS POST LABRAL REPAIR OF SHOULDER: Primary | ICD-10-CM

## 2021-07-26 DIAGNOSIS — S16.1XXS STRAIN OF NECK MUSCLE, SEQUELA: ICD-10-CM

## 2021-07-26 PROCEDURE — 97140 MANUAL THERAPY 1/> REGIONS: CPT | Performed by: PHYSICAL THERAPIST

## 2021-07-26 PROCEDURE — 97530 THERAPEUTIC ACTIVITIES: CPT | Performed by: PHYSICAL THERAPIST

## 2021-07-26 PROCEDURE — 97110 THERAPEUTIC EXERCISES: CPT | Performed by: PHYSICAL THERAPIST

## 2021-07-26 NOTE — PROGRESS NOTES
Physical Therapy Daily Progress Note    Patient Information  Raquel Guzman  2000      Visit # : 4    Raquel Guzman reports 5/10 pain today at rest.  Pt reports she is hurting a little more from doing more at work today.  Pt with improved status.      The anterior shoulder is the most painful area today.       Objective Pt presents to PT today with no sling in use.     Pt with minimal guarding noted at rest and with activity.     See Exercise, Manual, and Modality Logs for complete treatment.     Assessment/Plan  Pt with improved resting position.  She is having more soreness at rest due to more activity today out of the sling.        Progress per Plan of Care and Progress strengthening /stabilization /functional activity  Continue to progress as arm allows.     Visit Diagnoses:    ICD-10-CM ICD-9-CM   1. Status post labral repair of shoulder  Z98.890 V45.89   2. Strain of left shoulder, sequela  S46.912S 905.7   3. Strain of neck muscle, sequela  S16.1XXS 905.7            Manual Therapy:    9     mins  70325;  Therapeutic Exercise:    25     mins  34639;     Neuromuscular Ruy:        mins  18960;    Therapeutic Activity:     10     mins  78159;     Gait Training:           mins  39809;     Ultrasound:          mins  81487;    Electrical Stimulation:         mins  57032 ( );  Dry Needling          mins self-pay    Timed Treatment:   44   mins   Total Treatment:     54   mins          Jefferson Narvaez, PT  Physical Therapist

## 2021-07-30 ENCOUNTER — TREATMENT (OUTPATIENT)
Dept: PHYSICAL THERAPY | Facility: CLINIC | Age: 21
End: 2021-07-30

## 2021-07-30 DIAGNOSIS — S46.912S STRAIN OF LEFT SHOULDER, SEQUELA: ICD-10-CM

## 2021-07-30 DIAGNOSIS — Z98.890 STATUS POST LABRAL REPAIR OF SHOULDER: Primary | ICD-10-CM

## 2021-07-30 PROCEDURE — 97530 THERAPEUTIC ACTIVITIES: CPT | Performed by: PHYSICAL THERAPIST

## 2021-07-30 PROCEDURE — 97110 THERAPEUTIC EXERCISES: CPT | Performed by: PHYSICAL THERAPIST

## 2021-07-30 NOTE — PROGRESS NOTES
Physical Therapy Daily Progress Note    Patient Information  Raquel Guzman  2000      Visit # : 5    Raquel Guzman reports 5/10 pain today at rest.  Pt reports her pain is elevated from increased activity.  She is distracted and doing more activity and not paying attention to avoid using it.      Pt reports she is going back to MD next week.     Objective Pt presents to PT today with minimal distress at rest.      AROM:  L shoulder flexion 120 degrees AG     Wall slides L shoulder flexion 152 degrees.       See Exercise, Manual, and Modality Logs for complete treatment.     Assessment/Plan  Pt with improved overall function.  She is having some elevated soreness and MM tension from doing more and not resting with support enough.        Progress per Plan of Care and Progress strengthening /stabilization /functional activity      Visit Diagnoses:    ICD-10-CM ICD-9-CM   1. Status post labral repair of shoulder  Z98.890 V45.89   2. Strain of left shoulder, sequela  S46.912S 905.7            Manual Therapy:         mins  01753;  Therapeutic Exercise:    26     mins  55037;     Neuromuscular Ruy:        mins  68978;    Therapeutic Activity:     16     mins  03379;     Gait Training:           mins  64280;     Ultrasound:          mins  22776;    Electrical Stimulation:         mins  14695 ( );  Dry Needling          mins self-pay    Timed Treatment:   42   mins   Total Treatment:     42   mins          Jefferson Narvaez, PT  Physical Therapist

## 2021-08-03 ENCOUNTER — TREATMENT (OUTPATIENT)
Dept: PHYSICAL THERAPY | Facility: CLINIC | Age: 21
End: 2021-08-03

## 2021-08-03 DIAGNOSIS — G89.29 CHRONIC LEFT SHOULDER PAIN: Primary | ICD-10-CM

## 2021-08-03 DIAGNOSIS — M25.512 CHRONIC LEFT SHOULDER PAIN: Primary | ICD-10-CM

## 2021-08-03 PROCEDURE — 97110 THERAPEUTIC EXERCISES: CPT | Performed by: PHYSICAL THERAPIST

## 2021-08-03 PROCEDURE — 97140 MANUAL THERAPY 1/> REGIONS: CPT | Performed by: PHYSICAL THERAPIST

## 2021-08-03 PROCEDURE — 97530 THERAPEUTIC ACTIVITIES: CPT | Performed by: PHYSICAL THERAPIST

## 2021-08-03 NOTE — PROGRESS NOTES
Physical Therapy Daily Progress Note    Patient: Raquel Guzman   : 2000  Diagnosis/ICD-10 Code:  Chronic left shoulder pain [M25.512, G89.29]  Referring practitioner: Medardo Ferguson PA-C  Date of Initial Visit: Type: THERAPY  Noted: 2021  Today's Date: 8/3/2021  Patient seen for 6 sessions         Raquel Guzman reports that her shoulder is feeling more mobile overall since surgery.  States that she is sleeping longer and waking less.  But, still has soreness when lying on the left side.  Not taking any medication for pain.    Subjective     Objective   See Exercise, Manual, and Modality Logs for complete treatment.       Assessment/Plan  Ms. Guzman is s/p L GHJ Bankart repair with bicep tenodesis on 21.  Focused on improving forward elevation mobility and general shoulder strength with light load.  Avoided movements and/or exercises that aggravated anterior shoulder.           Manual Therapy:    9     mins  27115;  Therapeutic Exercise:    15     mins  94522;     Neuromuscular Ruy:        mins  62835;    Therapeutic Activity:     15     mins  41867;     Gait Training:           mins  81802;     Ultrasound:          mins  57158;    Electrical Stimulation:         mins  67146 ( );  Dry Needling          mins self-pay    Timed Treatment:   39   mins   Total Treatment:     40   mins    Eladio Locke PT  Physical Therapist

## 2021-08-24 ENCOUNTER — TREATMENT (OUTPATIENT)
Dept: PHYSICAL THERAPY | Facility: CLINIC | Age: 21
End: 2021-08-24

## 2021-08-24 DIAGNOSIS — Z98.890 STATUS POST LABRAL REPAIR OF SHOULDER: ICD-10-CM

## 2021-08-24 DIAGNOSIS — M25.512 CHRONIC LEFT SHOULDER PAIN: Primary | ICD-10-CM

## 2021-08-24 DIAGNOSIS — S46.912S STRAIN OF LEFT SHOULDER, SEQUELA: ICD-10-CM

## 2021-08-24 DIAGNOSIS — G89.29 CHRONIC LEFT SHOULDER PAIN: Primary | ICD-10-CM

## 2021-08-24 PROCEDURE — 97530 THERAPEUTIC ACTIVITIES: CPT | Performed by: PHYSICAL THERAPIST

## 2021-08-24 PROCEDURE — 97110 THERAPEUTIC EXERCISES: CPT | Performed by: PHYSICAL THERAPIST

## 2021-08-24 NOTE — PROGRESS NOTES
Physical Therapy Daily Progress Note    Patient Information  Raquel Guzman  2000      Visit # : 7    Raquel Guzman reports 4/10 pain today at rest.  Pt with overall improvement.  She is doing a new job and it is a little more difficult with the L shoulder.      The L anterior shoulder.      Objective Pt presents to PT today with no distress at rest.  She is noted to the L shoulder adducted at rest.     AROM:  Standing flexion 146 degrees L UE,  L shoulder Abd  degrees,      AROM:  Supine L shoulder flexion 154 degrees. ER 78 degrees, IR 63 degrees.     See Exercise, Manual, and Modality Logs for complete treatment.     Assessment/Plan  Pt with improved ROM and strength.  She needs to be more compliant at home with Exercises.  She is slowly recovering from surgery.       Progress per Plan of Care and Progress strengthening /stabilization /functional activity  Re-assess for more PT visit next visit.     Visit Diagnoses:    ICD-10-CM ICD-9-CM   1. Chronic left shoulder pain  M25.512 719.41    G89.29 338.29   2. Status post labral repair of shoulder  Z98.890 V45.89   3. Strain of left shoulder, sequela  S46.912S 905.7            Manual Therapy:         mins  23192;  Therapeutic Exercise:    41     mins  87569;     Neuromuscular Ruy:        mins  83538;    Therapeutic Activity:     12     mins  22105;     Gait Training:           mins  56021;     Ultrasound:          mins  77920;    Electrical Stimulation:         mins  05603 ( );  Dry Needling          mins self-pay    Timed Treatment:   53   mins   Total Treatment:     53   mins          Jefferson Narvaez, PT  Physical Therapist

## 2021-09-09 ENCOUNTER — TRANSCRIBE ORDERS (OUTPATIENT)
Dept: PHYSICAL THERAPY | Facility: CLINIC | Age: 21
End: 2021-09-09

## 2021-09-09 DIAGNOSIS — Z98.890 STATUS POST LABRAL REPAIR OF SHOULDER: Primary | ICD-10-CM

## 2021-09-16 ENCOUNTER — TREATMENT (OUTPATIENT)
Dept: PHYSICAL THERAPY | Facility: CLINIC | Age: 21
End: 2021-09-16

## 2021-09-16 DIAGNOSIS — M25.512 CHRONIC LEFT SHOULDER PAIN: Primary | ICD-10-CM

## 2021-09-16 DIAGNOSIS — G89.29 CHRONIC LEFT SHOULDER PAIN: Primary | ICD-10-CM

## 2021-09-16 PROCEDURE — 97140 MANUAL THERAPY 1/> REGIONS: CPT | Performed by: PHYSICAL THERAPIST

## 2021-09-16 PROCEDURE — 97110 THERAPEUTIC EXERCISES: CPT | Performed by: PHYSICAL THERAPIST

## 2021-09-16 PROCEDURE — 97112 NEUROMUSCULAR REEDUCATION: CPT | Performed by: PHYSICAL THERAPIST

## 2021-09-16 NOTE — PROGRESS NOTES
"Physical Therapy Daily Progress Note/ Re-assessment     TOTAL TIME: 40 MINUTES  Raquel Guzman reports: patient has not been seen in therapy since 8/24 due to multiple cancellations/ no -shows; her date of surgery was June 7th; her latest MD note states that she is \" progressing well following her arthroscopic shoulder surgery\"; patient is working full duty as [employer] ; she tried to use a dari as much as she can    Objective   See Exercise, Manual, and Modality Logs for complete treatment.     Quick Dash today: 31.82  THERAPEUTIC EXERCISES/ACTIVITIES ADDED TODAY: see flow sheets    Manual: right GH mobilizations x 10 minutes    MMT: right shoulder grossly 4+/5, abduction 4/5    AROM: WFL all planes    Assessment/Plan  Patient has made good progress with therapy to this point ; has been inconsistent with therapy but doing well overall ; could benefit from PT at least 1 x per week to advance through strengthening advancement for right shoulder    LONG TERM GOALS:   8 weeks (From Initial Evaluation)  1. Pt to demonstrate AROM of the left shoulder to WNL to allow ability to perform all necessary functional activities  2. Pt to demonstrate ability to lift arm OH with the left arm without increase in pain  3. Pt to report being able to work full duty without increase in pain in the shoulder  4. Pt to tolerate 60 minutes continuous activity in the clinic without increase in pain      Goals:  5/5 MMT of right shoulder  Pt able to lift 10 # overhead without an increase in pain  Able to perform up to 60 minutes of UE strengthening without a significant increase in pain  Quick Dash to < 20     Progress per Plan of Care and Progress strengthening /stabilization /functional activity           Manual Therapy:    10     mins  82338;  Therapeutic Exercise:    15     mins  74729;     Neuromuscular Ruy:    15    mins  60045;    Therapeutic Activity:          mins  28220;     Gait Training:           mins  64662;   "   Ultrasound:          mins  04672;    Electrical Stimulation:         mins  08050 ( );  Dry Needling          mins self-pay    Timed Treatment:   40   mins   Total Treatment:     40   mins    ZACH Saravia PT  Physical Therapist

## 2021-10-20 ENCOUNTER — TREATMENT (OUTPATIENT)
Dept: PHYSICAL THERAPY | Facility: CLINIC | Age: 21
End: 2021-10-20

## 2021-10-20 DIAGNOSIS — G89.29 CHRONIC LEFT SHOULDER PAIN: Primary | ICD-10-CM

## 2021-10-20 DIAGNOSIS — M25.512 CHRONIC LEFT SHOULDER PAIN: Primary | ICD-10-CM

## 2021-10-20 DIAGNOSIS — Z98.890 STATUS POST LABRAL REPAIR OF SHOULDER: ICD-10-CM

## 2021-10-20 PROCEDURE — 97140 MANUAL THERAPY 1/> REGIONS: CPT | Performed by: PHYSICAL THERAPIST

## 2021-10-20 PROCEDURE — 97530 THERAPEUTIC ACTIVITIES: CPT | Performed by: PHYSICAL THERAPIST

## 2021-10-20 PROCEDURE — 97110 THERAPEUTIC EXERCISES: CPT | Performed by: PHYSICAL THERAPIST

## 2021-10-20 NOTE — PROGRESS NOTES
Physical Therapy Daily Progress Note    Patient Information  Raquel Guzman  2000      Visit # : 9    Raquel Guzman reports 6/10 pain today at rest.  Patient reports that her shoulder was doing good until last week. She states now the shoulder hurts a lot. She states that she does not recall an injury. She states that roughly 2-3 weeks ago she tripped over a soccer ball and landed directly on the L shoulder. She states that it did not hurt which is why she feels the fall is unrelated to her pain now. She feels like it could be from lifting boxes at work. She states she feels like she has lost some ROM.         Objective Pt presents to PT today with no distress noted.     L shoulder AROM  Flexion: 150  Abduction: 146    L shoulder PROM   Flexion: 174  Abduction: 160  ER @ 90: 80  IR @ 90: 78    Patient with no tenderness to palpation in L shoulder.     AC joint shear test: negative    Patient with mild tenderness to palpation in L upper trap as well as cervical paraspinals.       See Exercise, Manual, and Modality Logs for complete treatment.     Assessment/Plan  Patient tolerated session well. She had mild increases in shoulder pain with exercises but this returned to baseline at completion of each exercise. Patient continues to demonstrate good strength in the L shoulder. Patient with no tenderness to palpation in L shoulder today and no swelling noted. Patient with mild reports of pain in L upper trap and cervical paraspinals. Patient had reports of relief with cervical distraction. Patient encouraged to continue HEP and utilize ice frequently at home.      Progress per Plan of Care  PT will continue to monitor patients signs and symptoms and progress patient as tolerated.     Visit Diagnoses:    ICD-10-CM ICD-9-CM   1. Chronic left shoulder pain  M25.512 719.41    G89.29 338.29   2. Status post labral repair of shoulder  Z98.890 V45.89            Manual Therapy:    12     mins  34234;  Therapeutic  Exercise:    18     mins  66647;     Neuromuscular Ruy:        mins  29626;    Therapeutic Activity:     10     mins  12924;     Gait Training:           mins  07625;     Ultrasound:          mins  15349;    Electrical Stimulation:         mins  03612 ( );  Dry Needling          mins self-pay    Timed Treatment:   40   mins   Total Treatment:     50   mins          Catina Yu, PT  Physical Therapist

## 2021-10-28 ENCOUNTER — TREATMENT (OUTPATIENT)
Dept: PHYSICAL THERAPY | Facility: CLINIC | Age: 21
End: 2021-10-28

## 2021-10-28 DIAGNOSIS — M25.512 CHRONIC LEFT SHOULDER PAIN: Primary | ICD-10-CM

## 2021-10-28 DIAGNOSIS — G89.29 CHRONIC LEFT SHOULDER PAIN: Primary | ICD-10-CM

## 2021-10-28 DIAGNOSIS — Z98.890 STATUS POST LABRAL REPAIR OF SHOULDER: ICD-10-CM

## 2021-10-28 PROCEDURE — 97140 MANUAL THERAPY 1/> REGIONS: CPT | Performed by: PHYSICAL THERAPIST

## 2021-10-28 PROCEDURE — 97110 THERAPEUTIC EXERCISES: CPT | Performed by: PHYSICAL THERAPIST

## 2021-10-28 PROCEDURE — 97530 THERAPEUTIC ACTIVITIES: CPT | Performed by: PHYSICAL THERAPIST

## 2021-10-28 NOTE — PROGRESS NOTES
Physical Therapy Daily Progress Note    Patient Information  Raquel Guzman  2000      Visit # : 10    Raquel Guzman reports 4/10 pain today at rest.  Patient reports that her shoulder has been doing a little better. She states that her neck has not been as tight. She states that she has been trying to sleep in better positions for her neck.         Objective Pt presents to PT today with no distress noted.     Patient with mild tenderness to palpation in L upper trap and L SCM.     L shoulder AROM   Flexion: 158  Abduction: 150      See Exercise, Manual, and Modality Logs for complete treatment.     Assessment/Plan  Patient tolerated session well with no increases in pain with exercises. Patient with tenderness in upper trap and SCM today. Patient tolerated manual therapy well with no increases in pain reported. Patient demonstrating mild tightness in L pec major. This improved following manual therapy. Patient encouraged to continue HEP and utilize ice as needed at home. Patient encouraged to utilize L UE for all activities that do not increase pain in L shoulder.       Progress per Plan of Care  PT will continue to monitor patients signs and symptoms and progress patient as tolerated.     Visit Diagnoses:    ICD-10-CM ICD-9-CM   1. Chronic left shoulder pain  M25.512 719.41    G89.29 338.29   2. Status post labral repair of shoulder  Z98.890 V45.89            Manual Therapy:    15     mins  25409;  Therapeutic Exercise:    17     mins  37206;     Neuromuscular Ruy:        mins  99023;    Therapeutic Activity:     9     mins  30310;     Gait Training:           mins  93238;     Ultrasound:          mins  86110;    Electrical Stimulation:         mins  91416 ( );  Dry Needling          mins self-pay    Timed Treatment:   41   mins   Total Treatment:     51   mins          Catina Yu PT  Physical Therapist

## 2021-11-11 ENCOUNTER — TELEPHONE (OUTPATIENT)
Dept: URGENT CARE | Facility: CLINIC | Age: 21
End: 2021-11-11

## 2021-11-11 ENCOUNTER — TREATMENT (OUTPATIENT)
Dept: PHYSICAL THERAPY | Facility: CLINIC | Age: 21
End: 2021-11-11

## 2021-11-11 DIAGNOSIS — G89.29 CHRONIC LEFT SHOULDER PAIN: Primary | ICD-10-CM

## 2021-11-11 DIAGNOSIS — M25.512 CHRONIC LEFT SHOULDER PAIN: Primary | ICD-10-CM

## 2021-11-11 DIAGNOSIS — S05.01XA ABRASION OF RIGHT CORNEA, INITIAL ENCOUNTER: ICD-10-CM

## 2021-11-11 DIAGNOSIS — Z98.890 STATUS POST LABRAL REPAIR OF SHOULDER: ICD-10-CM

## 2021-11-11 DIAGNOSIS — S05.01XA INJURY OF CONJUNCTIVA AND CORNEAL ABRASION WITHOUT FOREIGN BODY, RIGHT EYE, INITIAL ENCOUNTER: ICD-10-CM

## 2021-11-11 PROCEDURE — 97530 THERAPEUTIC ACTIVITIES: CPT | Performed by: PHYSICAL THERAPIST

## 2021-11-11 PROCEDURE — 97140 MANUAL THERAPY 1/> REGIONS: CPT | Performed by: PHYSICAL THERAPIST

## 2021-11-11 RX ORDER — ERYTHROMYCIN 5 MG/G
OINTMENT OPHTHALMIC
Qty: 1 EACH | Refills: 0 | Status: SHIPPED | OUTPATIENT
Start: 2021-11-11 | End: 2022-08-28

## 2021-11-11 NOTE — TELEPHONE ENCOUNTER
Patient lost previous tube of erythromycin  ointment request a new prescription which was sent to her listed pharmacy of choice.

## 2021-11-11 NOTE — PROGRESS NOTES
Physical Therapy Daily Progress Note    Patient Information  Raquel Guzman  2000      Visit # : 11    Raquel Guzman reports /10 pain today at rest.  Patient reports that her shoulder is hurting some today. She states that she feels like it is caused by the way she sleeps at night. She states that she also lifted packages yesterday that could have irritated her shoulder.         Objective Pt presents to PT today with no distress noted.     Patient with tightness in L upper trap today.     Patient with mild-moderate tenderness to palpation in L pec major.     L shoulder AROM   Flexion: 160  Abduction: 151      See Exercise, Manual, and Modality Logs for complete treatment.     Assessment/Plan  Patient tolerated session well. She had mild reports of discomfort in anterior shoulder with wall slide exercise. Discomfort resolved at completion of exercises. Patient tolerated manual therapy well. She had reports of reduced pain at completion of session. Patient was encouraged to continue HEP and utilize ice as needed at home.       Progress per Plan of Care  PT will continue to monitor patients signs and symptoms and progress patient as tolerated.     Visit Diagnoses:    ICD-10-CM ICD-9-CM   1. Chronic left shoulder pain  M25.512 719.41    G89.29 338.29   2. Status post labral repair of shoulder  Z98.890 V45.89            Manual Therapy:    16     mins  15665;  Therapeutic Exercise:         mins  73683;     Neuromuscular Ruy:        mins  71319;    Therapeutic Activity:     10     mins  43980;     Gait Training:           mins  88571;     Ultrasound:          mins  88314;    Electrical Stimulation:         mins  69476 ( );  Dry Needling          mins self-pay    Timed Treatment:   26   mins   Total Treatment:     54   mins          Catina Yu PT  Physical Therapist

## 2021-12-02 ENCOUNTER — TREATMENT (OUTPATIENT)
Dept: PHYSICAL THERAPY | Facility: CLINIC | Age: 21
End: 2021-12-02

## 2021-12-02 DIAGNOSIS — Z98.890 STATUS POST LABRAL REPAIR OF SHOULDER: ICD-10-CM

## 2021-12-02 DIAGNOSIS — M25.512 CHRONIC LEFT SHOULDER PAIN: Primary | ICD-10-CM

## 2021-12-02 DIAGNOSIS — G89.29 CHRONIC LEFT SHOULDER PAIN: Primary | ICD-10-CM

## 2021-12-02 PROCEDURE — 97530 THERAPEUTIC ACTIVITIES: CPT | Performed by: PHYSICAL THERAPIST

## 2021-12-02 PROCEDURE — 97140 MANUAL THERAPY 1/> REGIONS: CPT | Performed by: PHYSICAL THERAPIST

## 2022-05-10 ENCOUNTER — HOSPITAL ENCOUNTER (EMERGENCY)
Facility: HOSPITAL | Age: 22
Discharge: HOME | End: 2022-05-10
Attending: EMERGENCY MEDICINE | Admitting: EMERGENCY MEDICINE

## 2022-05-10 ENCOUNTER — APPOINTMENT (OUTPATIENT)
Dept: GENERAL RADIOLOGY | Facility: HOSPITAL | Age: 22
End: 2022-05-10

## 2022-05-10 VITALS
TEMPERATURE: 97.6 F | SYSTOLIC BLOOD PRESSURE: 118 MMHG | DIASTOLIC BLOOD PRESSURE: 76 MMHG | OXYGEN SATURATION: 100 % | HEART RATE: 73 BPM | RESPIRATION RATE: 18 BRPM | BODY MASS INDEX: 29.27 KG/M2 | WEIGHT: 155 LBS | HEIGHT: 61 IN

## 2022-05-10 DIAGNOSIS — E87.6 HYPOKALEMIA: ICD-10-CM

## 2022-05-10 DIAGNOSIS — R07.9 CHEST PAIN, UNSPECIFIED TYPE: Primary | ICD-10-CM

## 2022-05-10 LAB
ALBUMIN SERPL-MCNC: 5 G/DL (ref 3.5–5.2)
ALBUMIN/GLOB SERPL: 1.9 G/DL
ALP SERPL-CCNC: 59 U/L (ref 39–117)
ALT SERPL W P-5'-P-CCNC: 12 U/L (ref 1–33)
ANION GAP SERPL CALCULATED.3IONS-SCNC: 12.1 MMOL/L (ref 5–15)
AST SERPL-CCNC: 21 U/L (ref 1–32)
BASOPHILS # BLD AUTO: 0.03 10*3/MM3 (ref 0–0.2)
BASOPHILS NFR BLD AUTO: 0.3 % (ref 0–1.5)
BILIRUB SERPL-MCNC: 0.4 MG/DL (ref 0–1.2)
BUN SERPL-MCNC: 12 MG/DL (ref 6–20)
BUN/CREAT SERPL: 12.2 (ref 7–25)
CALCIUM SPEC-SCNC: 9.2 MG/DL (ref 8.6–10.5)
CHLORIDE SERPL-SCNC: 99 MMOL/L (ref 98–107)
CO2 SERPL-SCNC: 21.9 MMOL/L (ref 22–29)
CREAT SERPL-MCNC: 0.98 MG/DL (ref 0.57–1)
DEPRECATED RDW RBC AUTO: 36.1 FL (ref 37–54)
EGFRCR SERPLBLD CKD-EPI 2021: 83.3 ML/MIN/1.73
EOSINOPHIL # BLD AUTO: 0.04 10*3/MM3 (ref 0–0.4)
EOSINOPHIL NFR BLD AUTO: 0.5 % (ref 0.3–6.2)
ERYTHROCYTE [DISTWIDTH] IN BLOOD BY AUTOMATED COUNT: 11 % (ref 12.3–15.4)
GLOBULIN UR ELPH-MCNC: 2.7 GM/DL
GLUCOSE SERPL-MCNC: 73 MG/DL (ref 65–99)
HCG SERPL QL: NEGATIVE
HCT VFR BLD AUTO: 38.1 % (ref 34–46.6)
HGB BLD-MCNC: 13.6 G/DL (ref 12–15.9)
HOLD SPECIMEN: NORMAL
IMM GRANULOCYTES # BLD AUTO: 0.02 10*3/MM3 (ref 0–0.05)
IMM GRANULOCYTES NFR BLD AUTO: 0.2 % (ref 0–0.5)
LYMPHOCYTES # BLD AUTO: 2.46 10*3/MM3 (ref 0.7–3.1)
LYMPHOCYTES NFR BLD AUTO: 28.3 % (ref 19.6–45.3)
MCH RBC QN AUTO: 32.2 PG (ref 26.6–33)
MCHC RBC AUTO-ENTMCNC: 35.7 G/DL (ref 31.5–35.7)
MCV RBC AUTO: 90.3 FL (ref 79–97)
MONOCYTES # BLD AUTO: 0.49 10*3/MM3 (ref 0.1–0.9)
MONOCYTES NFR BLD AUTO: 5.6 % (ref 5–12)
NEUTROPHILS NFR BLD AUTO: 5.64 10*3/MM3 (ref 1.7–7)
NEUTROPHILS NFR BLD AUTO: 65.1 % (ref 42.7–76)
NRBC BLD AUTO-RTO: 0 /100 WBC (ref 0–0.2)
PLATELET # BLD AUTO: 202 10*3/MM3 (ref 140–450)
PMV BLD AUTO: 10.9 FL (ref 6–12)
POTASSIUM SERPL-SCNC: 3.1 MMOL/L (ref 3.5–5.2)
PROT SERPL-MCNC: 7.7 G/DL (ref 6–8.5)
RBC # BLD AUTO: 4.22 10*6/MM3 (ref 3.77–5.28)
SODIUM SERPL-SCNC: 133 MMOL/L (ref 136–145)
TROPONIN T SERPL-MCNC: <0.01 NG/ML (ref 0–0.03)
WBC NRBC COR # BLD: 8.68 10*3/MM3 (ref 3.4–10.8)
WHOLE BLOOD HOLD SPECIMEN: NORMAL

## 2022-05-10 PROCEDURE — 99284 EMERGENCY DEPT VISIT MOD MDM: CPT

## 2022-05-10 PROCEDURE — 84703 CHORIONIC GONADOTROPIN ASSAY: CPT

## 2022-05-10 PROCEDURE — 25010000002 KETOROLAC TROMETHAMINE PER 15 MG: Performed by: EMERGENCY MEDICINE

## 2022-05-10 PROCEDURE — 93005 ELECTROCARDIOGRAM TRACING: CPT

## 2022-05-10 PROCEDURE — 84484 ASSAY OF TROPONIN QUANT: CPT

## 2022-05-10 PROCEDURE — 71045 X-RAY EXAM CHEST 1 VIEW: CPT

## 2022-05-10 PROCEDURE — 80053 COMPREHEN METABOLIC PANEL: CPT

## 2022-05-10 PROCEDURE — 85025 COMPLETE CBC W/AUTO DIFF WBC: CPT

## 2022-05-10 PROCEDURE — 96374 THER/PROPH/DIAG INJ IV PUSH: CPT

## 2022-05-10 RX ORDER — KETOROLAC TROMETHAMINE 30 MG/ML
15 INJECTION, SOLUTION INTRAMUSCULAR; INTRAVENOUS ONCE
Status: COMPLETED | OUTPATIENT
Start: 2022-05-10 | End: 2022-05-10

## 2022-05-10 RX ORDER — SODIUM CHLORIDE 0.9 % (FLUSH) 0.9 %
10 SYRINGE (ML) INJECTION AS NEEDED
Status: DISCONTINUED | OUTPATIENT
Start: 2022-05-10 | End: 2022-05-10 | Stop reason: HOSPADM

## 2022-05-10 RX ORDER — POTASSIUM CHLORIDE 750 MG/1
40 CAPSULE, EXTENDED RELEASE ORAL ONCE
Status: COMPLETED | OUTPATIENT
Start: 2022-05-10 | End: 2022-05-10

## 2022-05-10 RX ADMIN — KETOROLAC TROMETHAMINE 15 MG: 30 INJECTION, SOLUTION INTRAMUSCULAR at 12:50

## 2022-05-10 RX ADMIN — POTASSIUM CHLORIDE 40 MEQ: 750 CAPSULE, EXTENDED RELEASE ORAL at 13:24
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
## 2022-06-15 ENCOUNTER — HOSPITAL ENCOUNTER (OUTPATIENT)
Dept: GENERAL RADIOLOGY | Facility: HOSPITAL | Age: 22
Discharge: HOME | End: 2022-06-15
Admitting: NURSE PRACTITIONER

## 2022-06-15 ENCOUNTER — TRANSCRIBE ORDERS (OUTPATIENT)
Dept: GENERAL RADIOLOGY | Facility: HOSPITAL | Age: 22
End: 2022-06-15

## 2022-06-15 DIAGNOSIS — M25.551 RIGHT HIP PAIN: ICD-10-CM

## 2022-06-15 DIAGNOSIS — M54.41 ACUTE BACK PAIN WITH SCIATICA, RIGHT: Primary | ICD-10-CM

## 2022-06-15 DIAGNOSIS — M54.41 ACUTE BACK PAIN WITH SCIATICA, RIGHT: ICD-10-CM

## 2022-06-15 PROCEDURE — 72100 X-RAY EXAM L-S SPINE 2/3 VWS: CPT

## 2022-06-15 PROCEDURE — 73502 X-RAY EXAM HIP UNI 2-3 VIEWS: CPT

## 2022-08-28 PROCEDURE — U0004 COV-19 TEST NON-CDC HGH THRU: HCPCS | Performed by: EMERGENCY MEDICINE

## 2022-08-30 ENCOUNTER — TELEPHONE (OUTPATIENT)
Dept: URGENT CARE | Facility: CLINIC | Age: 22
End: 2022-08-30

## 2022-08-31 ENCOUNTER — OFFICE VISIT (OUTPATIENT)
Dept: OBSTETRICS AND GYNECOLOGY | Facility: CLINIC | Age: 22
End: 2022-08-31

## 2022-08-31 VITALS
SYSTOLIC BLOOD PRESSURE: 110 MMHG | WEIGHT: 166.2 LBS | DIASTOLIC BLOOD PRESSURE: 60 MMHG | HEIGHT: 62 IN | BODY MASS INDEX: 30.59 KG/M2

## 2022-08-31 DIAGNOSIS — Z11.3 ROUTINE SCREENING FOR STI (SEXUALLY TRANSMITTED INFECTION): ICD-10-CM

## 2022-08-31 DIAGNOSIS — Z01.419 WELL WOMAN EXAM WITH ROUTINE GYNECOLOGICAL EXAM: Primary | ICD-10-CM

## 2022-08-31 DIAGNOSIS — Z12.4 SCREENING FOR CERVICAL CANCER: ICD-10-CM

## 2022-08-31 PROCEDURE — 99395 PREV VISIT EST AGE 18-39: CPT | Performed by: PHYSICIAN ASSISTANT

## 2022-08-31 RX ORDER — BUPROPION HYDROCHLORIDE 150 MG/1
150 TABLET ORAL DAILY
COMMUNITY
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
## 2022-08-31 NOTE — TELEPHONE ENCOUNTER
Pt states that Dr. Faustin advised her that he would send in medication for pink eye. She adds that the pharmacy never received the RX for the eye drops and that her eyes aren't getting better. Joseline Brice sent in Cipro eye drops for pink and if her eyes did not improve she needed to return to the office to be reevaluated.

## 2022-08-31 NOTE — PATIENT INSTRUCTIONS
Self breast exam monthly  Regular exercise    RTO June for removal and reinsertion Kyleena. Call 1-2 months ahead of time so Kyleena can be ordered

## 2022-09-01 ENCOUNTER — TELEPHONE (OUTPATIENT)
Dept: OBSTETRICS AND GYNECOLOGY | Facility: CLINIC | Age: 22
End: 2022-09-01

## 2022-09-01 RX ORDER — AZITHROMYCIN 500 MG/1
TABLET, FILM COATED ORAL
Qty: 2 TABLET | Refills: 0 | Status: SHIPPED | OUTPATIENT
Start: 2022-09-01

## 2022-09-01 NOTE — TELEPHONE ENCOUNTER
----- Message from Reyna Peter MA sent at 9/1/2022  2:57 PM EDT -----  Cornelia with [facility] called . Patient is positive for Chlamydia. Pap is pending  Will fax report      Janis's Patient      Thank You

## 2022-09-01 NOTE — TELEPHONE ENCOUNTER
Please inform patient her Pap smear is positive for chlamydia.  I have sent in azithromycin to her pharmacy.  Her partner will need to seek treatment for chlamydia.  Abstain for 7 days once both have completed treatment.  Her Pap smear cytology is still pending.

## 2022-09-02 LAB — REF LAB TEST METHOD: NORMAL

## 2023-05-30 ENCOUNTER — OFFICE VISIT (OUTPATIENT)
Dept: OBSTETRICS AND GYNECOLOGY | Facility: CLINIC | Age: 23
End: 2023-05-30

## 2023-05-30 VITALS
HEIGHT: 62 IN | BODY MASS INDEX: 31.28 KG/M2 | DIASTOLIC BLOOD PRESSURE: 70 MMHG | WEIGHT: 170 LBS | SYSTOLIC BLOOD PRESSURE: 110 MMHG

## 2023-05-30 DIAGNOSIS — Z30.431 ENCOUNTER FOR MANAGEMENT OF INTRAUTERINE CONTRACEPTIVE DEVICE (IUD), UNSPECIFIED IUD MANAGEMENT TYPE: Primary | ICD-10-CM

## 2023-05-30 DIAGNOSIS — Z11.3 ROUTINE SCREENING FOR STI (SEXUALLY TRANSMITTED INFECTION): ICD-10-CM

## 2023-05-30 RX ORDER — LEVONORGESTREL 19.5 MG/1
1 INTRAUTERINE DEVICE INTRAUTERINE ONCE
Qty: 1 EACH | Refills: 0 | Status: SHIPPED | OUTPATIENT
Start: 2023-05-30 | End: 2023-06-03
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
## 2023-06-01 LAB
A VAGINAE DNA VAG QL NAA+PROBE: ABNORMAL SCORE
BVAB2 DNA VAG QL NAA+PROBE: ABNORMAL SCORE
C ALBICANS DNA VAG QL NAA+PROBE: NEGATIVE
C GLABRATA DNA VAG QL NAA+PROBE: NEGATIVE
C TRACH DNA VAG QL NAA+PROBE: NEGATIVE
MEGA1 DNA VAG QL NAA+PROBE: ABNORMAL SCORE
N GONORRHOEA DNA VAG QL NAA+PROBE: NEGATIVE
T VAGINALIS DNA VAG QL NAA+PROBE: NEGATIVE

## 2023-06-01 RX ORDER — METRONIDAZOLE 500 MG/1
500 TABLET ORAL 2 TIMES DAILY
Qty: 14 TABLET | Refills: 0 | Status: SHIPPED | OUTPATIENT
Start: 2023-06-01 | End: 2023-06-08

## 2023-06-01 NOTE — PROGRESS NOTES
Please let patient know swab returned positive for bacterial vaginosis and I have sent in Flagyl to pharmacy. Recommend she complete antibiotic before IUD changed out. Thanks

## 2025-03-19 ENCOUNTER — PATIENT ROUNDING (BHMG ONLY) (OUTPATIENT)
Dept: URGENT CARE | Facility: CLINIC | Age: 25
End: 2025-03-19

## 2025-04-20 ENCOUNTER — HOSPITAL ENCOUNTER (EMERGENCY)
Facility: HOSPITAL | Age: 25
Discharge: HOME | End: 2025-04-20
Attending: EMERGENCY MEDICINE | Admitting: EMERGENCY MEDICINE
Payer: OTHER MISCELLANEOUS

## 2025-04-20 ENCOUNTER — APPOINTMENT (OUTPATIENT)
Dept: GENERAL RADIOLOGY | Facility: HOSPITAL | Age: 25
End: 2025-04-20
Payer: OTHER MISCELLANEOUS

## 2025-04-20 VITALS
SYSTOLIC BLOOD PRESSURE: 136 MMHG | BODY MASS INDEX: 25.76 KG/M2 | TEMPERATURE: 98 F | HEART RATE: 82 BPM | DIASTOLIC BLOOD PRESSURE: 89 MMHG | HEIGHT: 62 IN | OXYGEN SATURATION: 99 % | RESPIRATION RATE: 18 BRPM | WEIGHT: 140 LBS

## 2025-04-20 DIAGNOSIS — M25.512 ACUTE PAIN OF LEFT SHOULDER: Primary | ICD-10-CM

## 2025-04-20 PROCEDURE — 73030 X-RAY EXAM OF SHOULDER: CPT

## 2025-04-20 PROCEDURE — 63710000001 ONDANSETRON ODT 4 MG TABLET DISPERSIBLE: Performed by: STUDENT IN AN ORGANIZED HEALTH CARE EDUCATION/TRAINING PROGRAM

## 2025-04-20 PROCEDURE — 99283 EMERGENCY DEPT VISIT LOW MDM: CPT | Performed by: EMERGENCY MEDICINE

## 2025-04-20 RX ORDER — OXYCODONE AND ACETAMINOPHEN 5; 325 MG/1; MG/1
1 TABLET ORAL ONCE
Refills: 0 | Status: COMPLETED | OUTPATIENT
Start: 2025-04-20 | End: 2025-04-20

## 2025-04-20 RX ORDER — CYCLOBENZAPRINE HCL 10 MG
10 TABLET ORAL 3 TIMES DAILY PRN
Qty: 12 TABLET | Refills: 0 | Status: SHIPPED | OUTPATIENT
Start: 2025-04-20

## 2025-04-20 RX ORDER — ONDANSETRON 4 MG/1
4 TABLET, ORALLY DISINTEGRATING ORAL ONCE
Status: COMPLETED | OUTPATIENT
Start: 2025-04-20 | End: 2025-04-20

## 2025-04-20 RX ORDER — CYCLOBENZAPRINE HCL 10 MG
10 TABLET ORAL ONCE
Status: COMPLETED | OUTPATIENT
Start: 2025-04-20 | End: 2025-04-20

## 2025-04-20 RX ORDER — MELOXICAM 7.5 MG/1
15 TABLET ORAL DAILY
Qty: 14 TABLET | Refills: 0 | Status: SHIPPED | OUTPATIENT
Start: 2025-04-20 | End: 2025-04-27

## 2025-04-20 RX ADMIN — CYCLOBENZAPRINE HYDROCHLORIDE 10 MG: 10 TABLET, FILM COATED ORAL at 15:35

## 2025-04-20 RX ADMIN — ONDANSETRON 4 MG: 4 TABLET, ORALLY DISINTEGRATING ORAL at 15:35

## 2025-04-20 RX ADMIN — OXYCODONE HYDROCHLORIDE AND ACETAMINOPHEN 1 TABLET: 5; 325 TABLET ORAL at 15:35
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
## 2025-04-20 NOTE — Clinical Note
[facility] EMERGENCY DEPARTMENT  [address] [address]  Phone: [phone]    Raquel Guzman was seen and treated in our emergency department on 4/20/2025.  She may return to work on 04/21/2025.         Thank you for choosing [facility].    Acosta Shelton PA-C

## 2025-04-20 NOTE — DISCHARGE INSTRUCTIONS
Call the office of Dr. Moore tomorrow and schedule first available appointment for follow-up.  Return to the ER for any acute change or worsening of your condition right away.

## 2025-08-22 ENCOUNTER — TREATMENT (OUTPATIENT)
Dept: PHYSICAL THERAPY | Facility: CLINIC | Age: 25
End: 2025-08-22
Payer: OTHER MISCELLANEOUS

## 2025-08-22 DIAGNOSIS — M25.512 CHRONIC LEFT SHOULDER PAIN: Primary | ICD-10-CM

## 2025-08-22 DIAGNOSIS — M25.612 DECREASED ROM OF LEFT SHOULDER: ICD-10-CM

## 2025-08-22 DIAGNOSIS — G89.29 CHRONIC LEFT SHOULDER PAIN: Primary | ICD-10-CM

## 2025-08-26 ENCOUNTER — TREATMENT (OUTPATIENT)
Dept: PHYSICAL THERAPY | Facility: CLINIC | Age: 25
End: 2025-08-26
Payer: OTHER MISCELLANEOUS

## 2025-08-26 DIAGNOSIS — M25.512 CHRONIC LEFT SHOULDER PAIN: Primary | ICD-10-CM

## 2025-08-26 DIAGNOSIS — G89.29 CHRONIC LEFT SHOULDER PAIN: Primary | ICD-10-CM

## 2025-08-26 DIAGNOSIS — M25.612 DECREASED ROM OF LEFT SHOULDER: ICD-10-CM

## 2025-08-28 ENCOUNTER — TREATMENT (OUTPATIENT)
Dept: PHYSICAL THERAPY | Facility: CLINIC | Age: 25
End: 2025-08-28
Payer: OTHER MISCELLANEOUS

## 2025-08-28 DIAGNOSIS — G89.29 CHRONIC LEFT SHOULDER PAIN: Primary | ICD-10-CM

## 2025-08-28 DIAGNOSIS — M25.612 DECREASED ROM OF LEFT SHOULDER: ICD-10-CM

## 2025-08-28 DIAGNOSIS — M25.512 CHRONIC LEFT SHOULDER PAIN: Primary | ICD-10-CM

## 2025-08-28 PROCEDURE — 97140 MANUAL THERAPY 1/> REGIONS: CPT

## 2025-08-28 PROCEDURE — 97110 THERAPEUTIC EXERCISES: CPT

## (undated) DEVICE — RICH GENERAL LAPAROSCOPY: Brand: MEDLINE INDUSTRIES, INC.

## (undated) DEVICE — SOL NACL 0.9PCT 1000ML

## (undated) DEVICE — ENDOPATH XCEL UNIVERSAL TROCAR STABLILITY SLEEVES: Brand: ENDOPATH XCEL

## (undated) DEVICE — ENDOPATH XCEL BLADELESS TROCARS WITH STABILITY SLEEVES: Brand: ENDOPATH XCEL

## (undated) DEVICE — UNDYED BRAIDED (POLYGLACTIN 910), SYNTHETIC ABSORBABLE SUTURE: Brand: COATED VICRYL

## (undated) DEVICE — MONOPOLAR METZENBAUM SCISSOR, MINI BLADE TIP, DISPOSABLE: Brand: MONOPOLAR METZENBAUM SCISSOR, MINI BLADE TIP, DISPOSABLE

## (undated) DEVICE — BLD CLIP UNIV SURG GRY

## (undated) DEVICE — SUT VIC 0/0 UR6 27IN DYED J603H

## (undated) DEVICE — 2, DISPOSABLE SUCTION/IRRIGATOR WITHOUT DISPOSABLE TIP: Brand: STRYKEFLOW

## (undated) DEVICE — GLV SURG SENSICARE W/ALOE PF LF 7.5 STRL

## (undated) DEVICE — DISPOSABLE MONOPOLAR ENDOSCOPIC CORD 10 FT. (3M): Brand: KIRWAN

## (undated) DEVICE — CUFF SCD HEMOFORCE SEQ CALF STD MD

## (undated) DEVICE — TP ELECTRD LAP L WR SPLIT33CM

## (undated) DEVICE — KT CATH CHOLANGIOGRA PERC W/BALLO

## (undated) DEVICE — CLAVICLE STRAP: Brand: DEROYAL